# Patient Record
Sex: FEMALE | Race: WHITE | Employment: FULL TIME | URBAN - METROPOLITAN AREA
[De-identification: names, ages, dates, MRNs, and addresses within clinical notes are randomized per-mention and may not be internally consistent; named-entity substitution may affect disease eponyms.]

---

## 2017-05-17 DIAGNOSIS — R60.0 PEDAL EDEMA: ICD-10-CM

## 2017-05-17 RX ORDER — FUROSEMIDE 40 MG/1
TABLET ORAL
Qty: 90 TAB | Refills: 3 | Status: SHIPPED | OUTPATIENT
Start: 2017-05-17 | End: 2018-06-18 | Stop reason: SDUPTHER

## 2017-12-12 ENCOUNTER — OFFICE VISIT (OUTPATIENT)
Dept: FAMILY MEDICINE CLINIC | Facility: CLINIC | Age: 52
End: 2017-12-12

## 2017-12-12 VITALS
SYSTOLIC BLOOD PRESSURE: 142 MMHG | BODY MASS INDEX: 37.39 KG/M2 | RESPIRATION RATE: 16 BRPM | DIASTOLIC BLOOD PRESSURE: 77 MMHG | WEIGHT: 219 LBS | HEIGHT: 64 IN | TEMPERATURE: 97.3 F | OXYGEN SATURATION: 97 %

## 2017-12-12 DIAGNOSIS — E66.09 CLASS 2 OBESITY DUE TO EXCESS CALORIES WITHOUT SERIOUS COMORBIDITY WITH BODY MASS INDEX (BMI) OF 37.0 TO 37.9 IN ADULT: ICD-10-CM

## 2017-12-12 DIAGNOSIS — Z72.0 TOBACCO ABUSE: ICD-10-CM

## 2017-12-12 DIAGNOSIS — K21.9 GASTROESOPHAGEAL REFLUX DISEASE WITHOUT ESOPHAGITIS: Primary | ICD-10-CM

## 2017-12-12 DIAGNOSIS — I87.2 VENOUS INSUFFICIENCY OF BOTH LOWER EXTREMITIES: ICD-10-CM

## 2017-12-12 PROBLEM — E66.01 OBESITY, MORBID (HCC): Status: ACTIVE | Noted: 2017-12-12

## 2017-12-12 RX ORDER — BUPROPION HYDROCHLORIDE 150 MG/1
150 TABLET, EXTENDED RELEASE ORAL 2 TIMES DAILY
Qty: 60 TAB | Refills: 5 | Status: SHIPPED | OUTPATIENT
Start: 2017-12-12 | End: 2018-06-19 | Stop reason: ALTCHOICE

## 2017-12-12 RX ORDER — NAPROXEN SODIUM 220 MG
220 TABLET ORAL AS NEEDED
COMMUNITY

## 2017-12-12 RX ORDER — PHENOL/SODIUM PHENOLATE
20 AEROSOL, SPRAY (ML) MUCOUS MEMBRANE DAILY
Qty: 90 TAB | Refills: 3 | Status: SHIPPED | OUTPATIENT
Start: 2017-12-12 | End: 2019-01-06 | Stop reason: SDUPTHER

## 2017-12-12 NOTE — PATIENT INSTRUCTIONS
Body Mass Index: Care Instructions  Your Care Instructions    Body mass index (BMI) can help you see if your weight is raising your risk for health problems. It uses a formula to compare how much you weigh with how tall you are. · A BMI lower than 18.5 is considered underweight. · A BMI between 18.5 and 24.9 is considered healthy. · A BMI between 25 and 29.9 is considered overweight. A BMI of 30 or higher is considered obese. If your BMI is in the normal range, it means that you have a lower risk for weight-related health problems. If your BMI is in the overweight or obese range, you may be at increased risk for weight-related health problems, such as high blood pressure, heart disease, stroke, arthritis or joint pain, and diabetes. If your BMI is in the underweight range, you may be at increased risk for health problems such as fatigue, lower protection (immunity) against illness, muscle loss, bone loss, hair loss, and hormone problems. BMI is just one measure of your risk for weight-related health problems. You may be at higher risk for health problems if you are not active, you eat an unhealthy diet, or you drink too much alcohol or use tobacco products. Follow-up care is a key part of your treatment and safety. Be sure to make and go to all appointments, and call your doctor if you are having problems. It's also a good idea to know your test results and keep a list of the medicines you take. How can you care for yourself at home? · Practice healthy eating habits. This includes eating plenty of fruits, vegetables, whole grains, lean protein, and low-fat dairy. · If your doctor recommends it, get more exercise. Walking is a good choice. Bit by bit, increase the amount you walk every day. Try for at least 30 minutes on most days of the week. · Do not smoke. Smoking can increase your risk for health problems. If you need help quitting, talk to your doctor about stop-smoking programs and medicines. These can increase your chances of quitting for good. · Limit alcohol to 2 drinks a day for men and 1 drink a day for women. Too much alcohol can cause health problems. If you have a BMI higher than 25  · Your doctor may do other tests to check your risk for weight-related health problems. This may include measuring the distance around your waist. A waist measurement of more than 40 inches in men or 35 inches in women can increase the risk of weight-related health problems. · Talk with your doctor about steps you can take to stay healthy or improve your health. You may need to make lifestyle changes to lose weight and stay healthy, such as changing your diet and getting regular exercise. If you have a BMI lower than 18.5  · Your doctor may do other tests to check your risk for health problems. · Talk with your doctor about steps you can take to stay healthy or improve your health. You may need to make lifestyle changes to gain or maintain weight and stay healthy, such as getting more healthy foods in your diet and doing exercises to build muscle. Where can you learn more? Go to http://jamie-boaz.info/. Enter S176 in the search box to learn more about \"Body Mass Index: Care Instructions. \"  Current as of: October 13, 2016  Content Version: 11.4  © 2421-6367 Healthwise, Incorporated. Care instructions adapted under license by [employer] (which disclaims liability or warranty for this information). If you have questions about a medical condition or this instruction, always ask your healthcare professional. [address] any warranty or liability for your use of this information.

## 2017-12-12 NOTE — PROGRESS NOTES
HISTORY OF PRESENT ILLNESS  Beatrice Conteh is a 50 y.o. female. HPI Comments: Seen in follow-up for GERD, pedal edema. She is still smoking 1 ppd - wonders about medication for cessation. No problems with current medications, which are working well for her. She didn't follow-up with Vascular after her last visit. Past Medical History:   Diagnosis Date    GERD (gastroesophageal reflux disease)     Musculoskeletal disorder        Past Surgical History:   Procedure Laterality Date    HX CERVICAL FUSION      plated for fracture    HX KNEE ARTHROSCOPY Right medial meniscus tear    HX ORTHOPAEDIC Right ACL resection       History   Smoking Status    Current Every Day Smoker   Smokeless Tobacco    Never Used     Current Outpatient Prescriptions   Medication Sig    naproxen sodium (ALEVE) 220 mg tablet Take 220 mg by mouth as needed.  furosemide (LASIX) 40 mg tablet TAKE 1 TABLET BY MOUTH DAILY FOR EDEMA    Omeprazole delayed release (PRILOSEC D/R) 20 mg tablet Take 1 Tab by mouth daily. Indications: GASTROESOPHAGEAL REFLUX    MV-Min-Folic Acid-Lutein (CENTRUM SILVER) 500-250 mcg chew Take  by mouth. No current facility-administered medications for this visit. Review of Systems   Constitutional: Negative for chills and fever. Respiratory: Negative for shortness of breath. Cardiovascular: Positive for leg swelling. Negative for chest pain and palpitations. Gastrointestinal: Negative for nausea and vomiting. Neurological: Negative for tingling, focal weakness and headaches. Psychiatric/Behavioral: The patient does not have insomnia. Visit Vitals    /77    Temp 97.3 °F (36.3 °C)    Resp 16    Ht 5' 4\" (1.626 m)    Wt 219 lb (99.3 kg)    SpO2 97%    BMI 37.59 kg/m2       Physical Exam   Constitutional: She is oriented to person, place, and time. She appears well-developed and well-nourished. No distress. Neck: Neck supple. No thyromegaly present.    Carotid bruit absent   Cardiovascular: Normal rate, regular rhythm and intact distal pulses. Exam reveals no gallop and no friction rub. No murmur heard. Pulmonary/Chest: Effort normal and breath sounds normal. No respiratory distress. Musculoskeletal: She exhibits edema. Lymphadenopathy:     She has no cervical adenopathy. Neurological: She is alert and oriented to person, place, and time. Skin: Skin is warm and dry. Psychiatric: She has a normal mood and affect. Her behavior is normal. Judgment and thought content normal.   Nursing note and vitals reviewed. ASSESSMENT and PLAN     ICD-10-CM ICD-9-CM    1. Gastroesophageal reflux disease without esophagitis K21.9 530.81 Omeprazole delayed release (PRILOSEC D/R) 20 mg tablet   2. Venous insufficiency of both lower extremities I87.2 459.81 REFERRAL TO VASCULAR CENTER   3. Class 2 obesity due to excess calories without serious comorbidity with body mass index (BMI) of 37.0 to 37.9 in adult E66.09 278.00     Z68.37 V85.37    4. Tobacco abuse Z72.0 305.1 buPROPion SR (WELLBUTRIN SR) 150 mg SR tablet     Follow-up Disposition:  Return in about 6 months (around 6/12/2018). the following changes in treatment are made: Wellbutrin for smoking cessation. Use of nicotine patches reviewed. Refills as noted. Referred back to Vascular Surgery. reviewed diet, exercise and weight control  very strongly urged to quit smoking to reduce cardiovascular risk  reviewed medications and side effects in detail    Discussed the patient's BMI with her. The BMI follow up plan is as follows:     dietary management education, guidance, and counseling  encourage exercise  monitor weight  prescribed dietary intake    An After Visit Summary was printed and given to the patient. Plan of care reviewed - patient verbalize(s) understanding and agreement.

## 2017-12-12 NOTE — MR AVS SNAPSHOT
Visit Information Date & Time Provider Department Dept. Phone Encounter #  
 12/12/2017  2:15 PM Cordell Castelan MD [facility] [phone] Follow-up Instructions Return in about 6 months (around 6/12/2018). Upcoming Health Maintenance Date Due Pneumococcal 19-64 Medium Risk (1 of 1 - PPSV23) 4/17/1986 FOBT Q 1 YEAR AGE 50-75 4/17/2017 PAP AKA CERVICAL CYTOLOGY 10/20/2018 DTaP/Tdap/Td series (2 - Td) 3/24/2025 Allergies as of 12/12/2017  Review Complete On: 12/12/2017 By: Cordell Castelan MD  
 No Known Allergies Current Immunizations  Never Reviewed Name Date Tdap 3/24/2015  3:27 PM  
  
 Not reviewed this visit You Were Diagnosed With   
  
 Codes Comments Gastroesophageal reflux disease without esophagitis    -  Primary ICD-10-CM: K21.9 ICD-9-CM: 530.81 Venous insufficiency of both lower extremities     ICD-10-CM: I87.2 ICD-9-CM: 459.81 Class 2 obesity due to excess calories without serious comorbidity with body mass index (BMI) of 37.0 to 37.9 in adult     ICD-10-CM: E66.09, S69.02 ICD-9-CM: 278.00, V85.37 Tobacco abuse     ICD-10-CM: Z72.0 ICD-9-CM: 305.1 GERD without esophagitis     ICD-10-CM: K21.9 ICD-9-CM: 530.81 Vitals BP Temp Resp Height(growth percentile) Weight(growth percentile) SpO2  
 142/77 97.3 °F (36.3 °C) 16 5' 4\" (1.626 m) 219 lb (99.3 kg) 97% BMI OB Status Smoking Status 37.59 kg/m2 Premenopausal Current Every Day Smoker Vitals History BMI and BSA Data Body Mass Index Body Surface Area  
 37.59 kg/m 2 2.12 m 2 Preferred Pharmacy Pharmacy Name Phone [address], [address] [place] [address] [phone] Your Updated Medication List  
  
   
This list is accurate as of: 12/12/17  3:44 PM.  Always use your most recent med list.  
  
  
  
  
 ALEVE 220 mg tablet Generic drug:  naproxen sodium Take 220 mg by mouth as needed. buPROPion  mg SR tablet Commonly known as:  Ferdinand Ponds Take 1 Tab by mouth two (2) times a day. Indications: Smoking Cessation CENTRUM SILVER 500-250 mcg Chew Generic drug:  MV-Min-Folic Acid-Lutein Take  by mouth. furosemide 40 mg tablet Commonly known as:  LASIX TAKE 1 TABLET BY MOUTH DAILY FOR EDEMA Omeprazole delayed release 20 mg tablet Commonly known as:  PRILOSEC D/R Take 1 Tab by mouth daily. Indications: gastroesophageal reflux disease Prescriptions Sent to Pharmacy Refills Omeprazole delayed release (PRILOSEC D/R) 20 mg tablet 3 Sig: Take 1 Tab by mouth daily. Indications: gastroesophageal reflux disease Class: Normal  
 Pharmacy: [address] Ph #: [phone] Route: Oral  
 buPROPion SR (WELLBUTRIN SR) 150 mg SR tablet 5 Sig: Take 1 Tab by mouth two (2) times a day. Indications: Smoking Cessation Class: Normal  
 Pharmacy: [address] Ph #: [phone] Route: Oral  
  
We Performed the Following REFERRAL TO VASCULAR CENTER [[id number] Custom] Comments: F/U venous insufficiency (2016) Follow-up Instructions Return in about 6 months (around 6/12/2018). Referral Information Referral ID Referred By Referred To  
  
 [id number] Jean Pierre Conway MD   
   [address], [address] Phone: [phone] Fax: [phone] Visits Status Start Date End Date 1 New Request 12/12/17 12/12/18 If your referral has a status of pending review or denied, additional information will be sent to support the outcome of this decision. Patient Instructions Body Mass Index: Care Instructions Your Care Instructions Body mass index (BMI) can help you see if your weight is raising your risk for health problems. It uses a formula to compare how much you weigh with how tall you are. · A BMI lower than 18.5 is considered underweight. · A BMI between 18.5 and 24.9 is considered healthy. · A BMI between 25 and 29.9 is considered overweight. A BMI of 30 or higher is considered obese. If your BMI is in the normal range, it means that you have a lower risk for weight-related health problems. If your BMI is in the overweight or obese range, you may be at increased risk for weight-related health problems, such as high blood pressure, heart disease, stroke, arthritis or joint pain, and diabetes. If your BMI is in the underweight range, you may be at increased risk for health problems such as fatigue, lower protection (immunity) against illness, muscle loss, bone loss, hair loss, and hormone problems. BMI is just one measure of your risk for weight-related health problems. You may be at higher risk for health problems if you are not active, you eat an unhealthy diet, or you drink too much alcohol or use tobacco products. Follow-up care is a key part of your treatment and safety. Be sure to make and go to all appointments, and call your doctor if you are having problems. It's also a good idea to know your test results and keep a list of the medicines you take. How can you care for yourself at home? · Practice healthy eating habits. This includes eating plenty of fruits, vegetables, whole grains, lean protein, and low-fat dairy. · If your doctor recommends it, get more exercise. Walking is a good choice. Bit by bit, increase the amount you walk every day. Try for at least 30 minutes on most days of the week. · Do not smoke. Smoking can increase your risk for health problems. If you need help quitting, talk to your doctor about stop-smoking programs and medicines. These can increase your chances of quitting for good. · Limit alcohol to 2 drinks a day for men and 1 drink a day for women. Too much alcohol can cause health problems. If you have a BMI higher than 25 · Your doctor may do other tests to check your risk for weight-related health problems. This may include measuring the distance around your waist. A waist measurement of more than 40 inches in men or 35 inches in women can increase the risk of weight-related health problems. · Talk with your doctor about steps you can take to stay healthy or improve your health. You may need to make lifestyle changes to lose weight and stay healthy, such as changing your diet and getting regular exercise. If you have a BMI lower than 18.5 · Your doctor may do other tests to check your risk for health problems. · Talk with your doctor about steps you can take to stay healthy or improve your health. You may need to make lifestyle changes to gain or maintain weight and stay healthy, such as getting more healthy foods in your diet and doing exercises to build muscle. Where can you learn more? Go to http://jamie-boaz.info/. Enter S176 in the search box to learn more about \"Body Mass Index: Care Instructions. \" Current as of: October 13, 2016 Content Version: 11.4 © 1469-9635 [employer]. Care instructions adapted under license by [email or web address] (which disclaims liability or warranty for this information). If you have questions about a medical condition or this instruction, always ask your healthcare professional. [address] any warranty or liability for your use of this information. Introducing [facility]! Dear Kathrine Wood: 
Thank you for requesting a [employer] account. Our records indicate that you have previously registered for a [employer] account but its currently inactive. Please call our [employer] support line at [phone]. Additional Information If you have questions, please visit the Frequently Asked Questions section of the [employer] website at [email or web address] Remember, [employer] is NOT to be used for urgent needs. For medical emergencies, dial 911. Now available from your iPhone and Android! Please provide this summary of care documentation to your next provider. Your primary care clinician is listed as Maryjo Schirmer. If you have any questions after today's visit, please call [phone].

## 2018-01-10 ENCOUNTER — OFFICE VISIT (OUTPATIENT)
Dept: VASCULAR SURGERY | Age: 53
End: 2018-01-10

## 2018-01-10 VITALS
HEIGHT: 64 IN | HEART RATE: 80 BPM | DIASTOLIC BLOOD PRESSURE: 78 MMHG | SYSTOLIC BLOOD PRESSURE: 140 MMHG | WEIGHT: 219 LBS | BODY MASS INDEX: 37.39 KG/M2

## 2018-01-10 DIAGNOSIS — I87.2 VENOUS INSUFFICIENCY OF BOTH LOWER EXTREMITIES: Primary | ICD-10-CM

## 2018-01-10 DIAGNOSIS — R60.0 BILATERAL LEG EDEMA: ICD-10-CM

## 2018-01-10 NOTE — MR AVS SNAPSHOT
Visit Information Date & Time Provider Department Dept. Phone Encounter #  
 1/10/2018  2:15 PM Edna Ellis, [address] [facility] [phone] [id number] Your Appointments 6/12/2018  2:30 PM  
ROUTINE CARE with Vanesa Mendes MD  
[employer] ([facility]) Appt Note: 6  mos follow up [address] [place] [id number]  
[phone]  
  
   
 [address] Upcoming Health Maintenance Date Due Pneumococcal 19-64 Medium Risk (1 of 1 - PPSV23) 4/17/1986 FOBT Q 1 YEAR AGE 50-75 4/17/2017 BREAST CANCER SCRN MAMMOGRAM 5/1/2017 PAP AKA CERVICAL CYTOLOGY 10/20/2018 DTaP/Tdap/Td series (2 - Td) 3/24/2025 Allergies as of 1/10/2018  Review Complete On: 1/10/2018 By: Evie Miller LPN No Known Allergies Current Immunizations  Never Reviewed Name Date Tdap 3/24/2015  3:27 PM  
  
 Not reviewed this visit You Were Diagnosed With   
  
 Codes Comments Venous insufficiency of both lower extremities    -  Primary ICD-10-CM: I87.2 ICD-9-CM: 459.81 Bilateral leg edema     ICD-10-CM: R60.0 ICD-9-CM: 779. 3 Vitals BP Pulse Height(growth percentile) Weight(growth percentile) BMI OB Status 140/78 (BP 1 Location: Left arm, BP Patient Position: Sitting) 80 5' 4\" (1.626 m) 219 lb (99.3 kg) 37.59 kg/m2 Premenopausal  
 Smoking Status Current Every Day Smoker Vitals History BMI and BSA Data Body Mass Index Body Surface Area  
 37.59 kg/m 2 2.12 m 2 Preferred Pharmacy Pharmacy Name Phone [address], [address] [place] [address] [phone] Your Updated Medication List  
  
   
This list is accurate as of: 1/10/18  3:37 PM.  Always use your most recent med list.  
  
  
  
  
 ALEVE 220 mg tablet Generic drug:  naproxen sodium Take 220 mg by mouth as needed. buPROPion  mg SR tablet Commonly known as:  Michael Hilts Take 1 Tab by mouth two (2) times a day. Indications: Smoking Cessation CENTRUM SILVER 500-250 mcg Chew Generic drug:  MV-Min-Folic Acid-Lutein Take  by mouth. furosemide 40 mg tablet Commonly known as:  LASIX TAKE 1 TABLET BY MOUTH DAILY FOR EDEMA Omeprazole delayed release 20 mg tablet Commonly known as:  PRILOSEC D/R Take 1 Tab by mouth daily. Indications: gastroesophageal reflux disease To-Do List   
 01/12/2018 Imaging:  DUPLEX LOWER EXT VENOUS BILAT AMB Introducing [facility]! Carissa Form introduces [employer] patient portal. Now you can access parts of your medical record, email your doctor's office, and request medication refills online. 1. In your internet browser, go to [email or web address] 2. Click on the First Time User? Click Here link in the Sign In box. You will see the New Member Sign Up page. 3. Enter your [employer] Access Code exactly as it appears below. You will not need to use this code after youve completed the sign-up process. If you do not sign up before the expiration date, you must request a new code. · [employer] Access Code: [email or web address] Expires: 4/10/2018  2:05 PM 
 
4. Enter the last four digits of your Social Security Number (xxxx) and Date of Birth (mm/dd/yyyy) as indicated and click Submit. You will be taken to the next sign-up page. 5. Create a [employer] Softwaret ID. This will be your [employer] login ID and cannot be changed, so think of one that is secure and easy to remember. 6. Create a [employer] password. You can change your password at any time. 7. Enter your Password Reset Question and Answer. This can be used at a later time if you forget your password. 8. Enter your e-mail address. You will receive e-mail notification when new information is available in [address] 9. Click Sign Up. You can now view and download portions of your medical record. 10. Click the Download Summary menu link to download a portable copy of your medical information. If you have questions, please visit the Frequently Asked Questions section of the [employer] website. Remember, [employer] is NOT to be used for urgent needs. For medical emergencies, dial 911. Now available from your iPhone and Android! Please provide this summary of care documentation to your next provider. Your primary care clinician is listed as Erick Yao. If you have any questions after today's visit, please call [phone].

## 2018-01-11 NOTE — PROGRESS NOTES
Roland Turner    Chief Complaint   Patient presents with    Varicose Veins       History and Physical    It has been 2 years since Ms Kay Giang was in for evaluation of lower extremity edema. She had ultrasound study but then no follow up. She confesses as well she had gone through a job change and had a very busy schedule  She is back now because her leg swelling has continued to bother her  As previously noted, it is generally worse on the left. It is below the knees and into the feet  As per previous discussions, she has made efforts with using compression stockings, leg elevation, and exercise. 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 so does have little time to elevate but tries when she can  The amount of swelling that had progressed is now contributing to heaviness, leg fatigue, and sometimes pain, as well as early skin color changes     Her previous study was negative for DVT but there was some venous reflux noted       Past Medical History:   Diagnosis Date    GERD (gastroesophageal reflux disease)     Musculoskeletal disorder      Patient Active Problem List   Diagnosis Code    GERD (gastroesophageal reflux disease) K21.9    Venous insufficiency of both lower extremities I87.2    Class 2 obesity due to excess calories without serious comorbidity with body mass index (BMI) of 37.0 to 37.9 in adult E66.09, Z68.37    Tobacco abuse Z72.0     Past Surgical History:   Procedure Laterality Date    HX CERVICAL FUSION      plated for fracture    HX KNEE ARTHROSCOPY Right medial meniscus tear    HX ORTHOPAEDIC Right ACL resection     Current Outpatient Prescriptions   Medication Sig Dispense Refill    naproxen sodium (ALEVE) 220 mg tablet Take 220 mg by mouth as needed.  Omeprazole delayed release (PRILOSEC D/R) 20 mg tablet Take 1 Tab by mouth daily.  Indications: gastroesophageal reflux disease 90 Tab 3    buPROPion SR (WELLBUTRIN SR) 150 mg SR tablet Take 1 Tab by mouth two (2) times a day. Indications: Smoking Cessation 60 Tab 5    furosemide (LASIX) 40 mg tablet TAKE 1 TABLET BY MOUTH DAILY FOR EDEMA 90 Tab 3    MV-Min-Folic Acid-Lutein (CENTRUM SILVER) 500-250 mcg chew Take  by mouth. No Known Allergies    Review of Systems    Review of Systems - History obtained from chart review and the patient  General ROS: negative  Psychological ROS: negative  Ophthalmic ROS: negative  Respiratory ROS: negative  Cardiovascular ROS: negative  Gastrointestinal ROS: negative  Musculoskeletal ROS: negative  Neurological ROS: negative  Dermatological ROS: no history of ulcers or celluliltis  Vascular ROS: leg edema and varicose veins    Physical   Visit Vitals    /78 (BP 1 Location: Left arm, BP Patient Position: Sitting)    Pulse 80    Ht 5' 4\" (1.626 m)    Wt 219 lb (99.3 kg)    BMI 37.59 kg/m2     General:  Alert, cooperative, no distress. Head:  Normocephalic, without obvious abnormality, atraumatic. Eyes:     Conjunctivae/corneas clear. Pupils equal, round, reactive to light. Extraocular movements intact. Extremities: Bilateral lower extremity edema, worse on the left, about 3+ edema  Scattered small varicosites/venules/spider veins scattered in the legs.  No phlebitis     Pulses: Difficult to palpate but no signs of arterial insufficiency    Skin: Early hyperpigmentation changes but no ulcers         Impression/Plan:     ICD-10-CM ICD-9-CM    1. Venous insufficiency of both lower extremities I87.2 459.81 DUPLEX LOWER EXT VENOUS BILAT AMB   2. Bilateral leg edema R60.0 782.3 DUPLEX LOWER EXT VENOUS BILAT AMB     Orders Placed This Encounter    DUPLEX LOWER EXT VENOUS BILAT AMB (Reflux)     I again discussed venous disease and provided updated literature for venous reflux and treatments  Continue with current efforts with compression and elevation  With notable reflux seen previously, and since study now 3years old and with worsening symptoms, would go ahead and repeat study  I will then call results and further recommendations for treatment options      JERICHO Machuca    Portions of this note have been entered using voice recognition software.

## 2018-01-17 ENCOUNTER — OFFICE VISIT (OUTPATIENT)
Dept: VASCULAR SURGERY | Age: 53
End: 2018-01-17

## 2018-01-17 DIAGNOSIS — R60.0 BILATERAL LEG EDEMA: ICD-10-CM

## 2018-01-17 DIAGNOSIS — I87.2 VENOUS INSUFFICIENCY OF BOTH LOWER EXTREMITIES: ICD-10-CM

## 2018-01-17 NOTE — PROCEDURES
[facility]   *** FINAL REPORT ***    Name: Mirna Pereira  MRN: [id number]       Outpatient  : 1965  HIS Order #: [id number]  [address] [facility] Visit #: [id number]  Date: 2018    TYPE OF TEST: Peripheral Venous Testing    REASON FOR TEST  Edema    Right Leg:-  Deep venous thrombosis:           No  Superficial venous thrombosis:    No  Deep venous insufficiency:        No  Superficial venous insufficiency: No    Left Leg:-  Deep venous thrombosis:           No  Superficial venous thrombosis:    No  Deep venous insufficiency:        No  Superficial venous insufficiency: Yes    Abnormal Valve Closure Times (seconds):    Left SSV:             1.1    Vein Mapping:    Diam.   Depth  (mm)    Right Great Saphenous Vein:    High Thigh:    Mid Thigh:    Low Thigh:    Knee:    High Calf:    Low Calf: Ankle:    Right Small Saphenous Vein:    SPJ:    Mid Calf: Ankle:    Giacomini:  Accessory saph.:  Chapin :  Abrams :    Left Great Saphenous Vein:    High Thigh:    Mid Thigh:    Low Thigh:    Knee:    High Calf:    Low Calf: Ankle:    Left Small Saphenous Vein:    SPJ:             2.8    Mid Calf:        1.9    Ankle:           1.3    Giacomini:  Accessory saph.:  Chapin :  Abrams :      INTERPRETATION/FINDINGS  Duplex images were obtained using 2-D gray scale, color flow and  spectral doppler analysis. The reflux exam was performed in the reverse   trendelenburg position. 1. No evidence of deep vein thrombosis or deep venous reflux in the  common femoral, proximal deep femoral, femoral, popliteal, posterior  tibial and peroneal veins bilaterally. 2. Left small saphenous vein reflux in the mid segment. The vein  extends up the posterior thigh. 3. No evidence of right small saphenous vein reflux from the  sapheno-popliteal junction to distal calf. 4. No evidence of great saphenous vein reflux bilaterally from the  sapheno-femoral junction to below the knee. 5.  Interstitial edema noted in both calves. 6. Triphasic posterior tibial artery flow bilaterally. ADDITIONAL COMMENTS    I have personally reviewed the data relevant to the interpretation of  this  study. TECHNOLOGIST: Gailen Blossom Darylene Bender, RVT, [facility]  Signed: 01/17/2018 02:03 PM    PHYSICIAN: Chantal Morejon D.O.   Signed: 01/19/2018 01:15 PM

## 2018-01-22 ENCOUNTER — TELEPHONE (OUTPATIENT)
Dept: VASCULAR SURGERY | Age: 53
End: 2018-01-22

## 2018-01-23 ENCOUNTER — TELEPHONE (OUTPATIENT)
Dept: VASCULAR SURGERY | Age: 53
End: 2018-01-23

## 2018-01-23 NOTE — TELEPHONE ENCOUNTER
Did finally speak with patient about results  No significant reflux   Could try lymphedema therapy or pump  But she confesses since she was here she did decrease her salt intake and has been faithfully wearing the compression stockings and notices good improvement in symptoms  She will stick with this for now and call back if any changes

## 2018-06-19 ENCOUNTER — OFFICE VISIT (OUTPATIENT)
Dept: FAMILY MEDICINE CLINIC | Facility: CLINIC | Age: 53
End: 2018-06-19

## 2018-06-19 VITALS
TEMPERATURE: 98 F | HEIGHT: 64 IN | RESPIRATION RATE: 18 BRPM | OXYGEN SATURATION: 96 % | HEART RATE: 73 BPM | DIASTOLIC BLOOD PRESSURE: 94 MMHG | WEIGHT: 223 LBS | SYSTOLIC BLOOD PRESSURE: 147 MMHG | BODY MASS INDEX: 38.07 KG/M2

## 2018-06-19 DIAGNOSIS — H10.13 ALLERGIC CONJUNCTIVITIS OF BOTH EYES: ICD-10-CM

## 2018-06-19 DIAGNOSIS — K21.9 GASTROESOPHAGEAL REFLUX DISEASE WITHOUT ESOPHAGITIS: ICD-10-CM

## 2018-06-19 DIAGNOSIS — E66.09 CLASS 2 OBESITY DUE TO EXCESS CALORIES WITHOUT SERIOUS COMORBIDITY WITH BODY MASS INDEX (BMI) OF 37.0 TO 37.9 IN ADULT: ICD-10-CM

## 2018-06-19 DIAGNOSIS — I10 ESSENTIAL HYPERTENSION: Primary | ICD-10-CM

## 2018-06-19 DIAGNOSIS — I89.0 LYMPHEDEMA OF BOTH LOWER EXTREMITIES: ICD-10-CM

## 2018-06-19 PROBLEM — Z72.0 TOBACCO ABUSE: Status: RESOLVED | Noted: 2017-12-12 | Resolved: 2018-06-19

## 2018-06-19 NOTE — PROGRESS NOTES
Chief Complaint   Patient presents with    Obesity       Pt preferred language for health care discussion is english. Is someone accompanying this pt? no    Is the patient using any DME equipment during OV? no    Depression Screening:  PHQ over the last two weeks 6/19/2018   Little interest or pleasure in doing things Not at all   Feeling down, depressed or hopeless Not at all   Total Score PHQ 2 0       Learning Assessment:  Learning Assessment 6/19/2018   PRIMARY LEARNER Patient   CO-LEARNER CAREGIVER -   PRIMARY LANGUAGE ENGLISH   LEARNER PREFERENCE PRIMARY DEMONSTRATION     READING   ANSWERED BY patient   RELATIONSHIP SELF       Health Maintenance reviewed and discussed per provider. Yes    Pt is due for   Health Maintenance Due   Topic Date Due    Pneumococcal 19-64 Medium Risk (1 of 1 - PPSV23) 04/17/1986    FOBT Q 1 YEAR AGE 50-75  04/17/2017    BREAST CANCER SCRN MAMMOGRAM  05/01/2017   . Please order/place referral if appropriate. Coordination of Care:  1. Have you been to the ER, urgent care clinic since your last visit? Hospitalized since your last visit? no    2. Have you seen or consulted any other health care providers outside of the [address] [place] [address] since your last visit? Include any pap smears or colon screening.  no

## 2018-06-19 NOTE — MR AVS SNAPSHOT
Jeanette Dwaine 
 
 
 [address] [place] [address] 
[phone] Patient: Raven Gurrola MRN: [id number] :1965 Visit Information Date & Time Provider Department Dept. Phone Encounter #  
 2018  2:15 PM Erin Lopez MD [facility] [phone] [id number] Follow-up Instructions Return in about 3 months (around 2018). Upcoming Health Maintenance Date Due Pneumococcal 19-64 Medium Risk (1 of 1 - PPSV23) 1986 FOBT Q 1 YEAR AGE 50-75 2017 BREAST CANCER SCRN MAMMOGRAM 2017 Influenza Age 5 to Adult 2018 PAP AKA CERVICAL CYTOLOGY 10/20/2018 DTaP/Tdap/Td series (2 - Td) 3/24/2025 Allergies as of 2018  Review Complete On: 2018 By: Erin Lopez MD  
 No Known Allergies Current Immunizations  Never Reviewed Name Date Tdap 3/24/2015  3:27 PM  
  
 Not reviewed this visit You Were Diagnosed With   
  
 Codes Comments Essential hypertension    -  Primary ICD-10-CM: I10 
ICD-9-CM: 401.9 Gastroesophageal reflux disease without esophagitis     ICD-10-CM: K21.9 ICD-9-CM: 530.81 Class 2 obesity due to excess calories without serious comorbidity with body mass index (BMI) of 37.0 to 37.9 in adult     ICD-10-CM: E66.09, K50.31 ICD-9-CM: 278.00, V85.37 Lymphedema of both lower extremities     ICD-10-CM: I89.0 ICD-9-CM: 770.1 Allergic conjunctivitis of both eyes     ICD-10-CM: H10.13 ICD-9-CM: 372.14 Vitals BP Pulse Temp Resp Height(growth percentile) Weight(growth percentile) (!) 147/94 (BP 1 Location: Right arm, BP Patient Position: Sitting) 73 98 °F (36.7 °C) (Oral) 18 5' 4\" (1.626 m) 223 lb (101.2 kg) SpO2 BMI OB Status Smoking Status 96% 38.28 kg/m2 Premenopausal Former Smoker BMI and BSA Data Body Mass Index Body Surface Area  
 38.28 kg/m 2 2.14 m 2 Preferred Pharmacy Pharmacy Name Phone [address], [address] [place] [address] [phone] Your Updated Medication List  
  
   
This list is accurate as of 6/19/18  2:59 PM.  Always use your most recent med list.  
  
  
  
  
 ALEVE 220 mg tablet Generic drug:  naproxen sodium Take 220 mg by mouth as needed. CENTRUM SILVER 500-250 mcg Chew Generic drug:  MV-Min-Folic Acid-Lutein Take  by mouth. furosemide 40 mg tablet Commonly known as:  LASIX TAKE 1 TABLET BY MOUTH ONCE DAILY FOR EDEMA Omeprazole delayed release 20 mg tablet Commonly known as:  PRILOSEC D/R Take 1 Tab by mouth daily. Indications: gastroesophageal reflux disease Follow-up Instructions Return in about 3 months (around 9/19/2018). Patient Instructions DASH Diet: Care Instructions Your Care Instructions The DASH diet is an eating plan that can help lower your blood pressure. DASH stands for Dietary Approaches to Stop Hypertension. Hypertension is high blood pressure. The DASH diet focuses on eating foods that are high in calcium, potassium, and magnesium. These nutrients can lower blood pressure. The foods that are highest in these nutrients are fruits, vegetables, low-fat dairy products, nuts, seeds, and legumes. But taking calcium, potassium, and magnesium supplements instead of eating foods that are high in those nutrients does not have the same effect. The DASH diet also includes whole grains, fish, and poultry. The DASH diet is one of several lifestyle changes your doctor may recommend to lower your high blood pressure. Your doctor may also want you to decrease the amount of sodium in your diet. Lowering sodium while following the DASH diet can lower blood pressure even further than just the DASH diet alone. Follow-up care is a key part of your treatment and safety.  Be sure to make and go to all appointments, and call your doctor if you are having problems. It's also a good idea to know your test results and keep a list of the medicines you take. How can you care for yourself at home? Following the DASH diet · Eat 4 to 5 servings of fruit each day. A serving is 1 medium-sized piece of fruit, ½ cup chopped or canned fruit, 1/4 cup dried fruit, or 4 ounces (½ cup) of fruit juice. Choose fruit more often than fruit juice. · Eat 4 to 5 servings of vegetables each day. A serving is 1 cup of lettuce or raw leafy vegetables, ½ cup of chopped or cooked vegetables, or 4 ounces (½ cup) of vegetable juice. Choose vegetables more often than vegetable juice. · Get 2 to 3 servings of low-fat and fat-free dairy each day. A serving is 8 ounces of milk, 1 cup of yogurt, or 1 ½ ounces of cheese. · Eat 6 to 8 servings of grains each day. A serving is 1 slice of bread, 1 ounce of dry cereal, or ½ cup of cooked rice, pasta, or cooked cereal. Try to choose whole-grain products as much as possible. · Limit lean meat, poultry, and fish to 2 servings each day. A serving is 3 ounces, about the size of a deck of cards. · Eat 4 to 5 servings of nuts, seeds, and legumes (cooked dried beans, lentils, and split peas) each week. A serving is 1/3 cup of nuts, 2 tablespoons of seeds, or ½ cup of cooked beans or peas. · Limit fats and oils to 2 to 3 servings each day. A serving is 1 teaspoon of vegetable oil or 2 tablespoons of salad dressing. · Limit sweets and added sugars to 5 servings or less a week. A serving is 1 tablespoon jelly or jam, ½ cup sorbet, or 1 cup of lemonade. · Eat less than 2,300 milligrams (mg) of sodium a day. If you limit your sodium to 1,500 mg a day, you can lower your blood pressure even more. Tips for success · Start small. Do not try to make dramatic changes to your diet all at once. You might feel that you are missing out on your favorite foods and then be more likely to not follow the plan.  Make small changes, and stick with them. Once those changes become habit, add a few more changes. · Try some of the following: ¨ Make it a goal to eat a fruit or vegetable at every meal and at snacks. This will make it easy to get the recommended amount of fruits and vegetables each day. ¨ Try yogurt topped with fruit and nuts for a snack or healthy dessert. ¨ Add lettuce, tomato, cucumber, and onion to sandwiches. ¨ Combine a ready-made pizza crust with low-fat mozzarella cheese and lots of vegetable toppings. Try using tomatoes, squash, spinach, broccoli, carrots, cauliflower, and onions. ¨ Have a variety of cut-up vegetables with a low-fat dip as an appetizer instead of chips and dip. ¨ Sprinkle sunflower seeds or chopped almonds over salads. Or try adding chopped walnuts or almonds to cooked vegetables. ¨ Try some vegetarian meals using beans and peas. Add garbanzo or kidney beans to salads. Make burritos and tacos with mashed casey beans or black beans. Where can you learn more? Go to http://jamieLaunchpad Toysboaz.info/. Enter Y758 in the search box to learn more about \"DASH Diet: Care Instructions. \" Current as of: September 21, 2016 Content Version: 11.4 © 4389-9955 [employer]. Care instructions adapted under license by [employer] (which disclaims liability or warranty for this information). If you have questions about a medical condition or this instruction, always ask your healthcare professional. [place] [address] any warranty or liability for your use of this information. Allergic Conjunctivitis in Children: Care Instructions Your Care Instructions Allergic conjunctivitis (say \"fnz-IWER-foi-VY-tus\") is an eye problem that many children get. It is often called pinkeye. In pinkeye, the lining of the eyelid and the eye surface become red and swollen. The lining is called the conjunctiva (say \"ddpw-cohc-ME-vuh\"). Pinkeye can be caused by bacteria, a virus, or an allergy. Your child's pinkeye is caused by an allergy. A substance (allergen) triggers a reaction that results in the symptoms. This type of pinkeye cannot be spread from person to person. Your child may have other symptoms of an allergy, such as a runny nose. Allergic pinkeye goes away when you keep your child away from the allergen that triggers the pinkeye. Triggers include pollen, mold, and animal skin cells (dander). But because it is not always possible to stay away from triggers, your doctor may suggest eyedrops to treat the symptoms. Antibiotics do not help with allergies. Follow-up care is a key part of your child's treatment and safety. Be sure to make and go to all appointments, and call your doctor if your child is having problems. It's also a good idea to know your child's test results and keep a list of the medicines your child takes. How can you care for your child at home? Use medicines as directed · Have your child take medicines exactly as prescribed. Call your doctor if you think your child is having a problem with his or her medicine. You will get more details on the specific medicines your doctor prescribes. · If the doctor gave your child eyedrops, use them as directed. Keep the bottle tip clean. To put in eyedrops: ¨ Tilt your child's head back and pull his or her lower eyelid down with one finger. ¨ Drop or squirt the medicine inside the lower lid. ¨ Have your child close the eye for 30 to 60 seconds to let the drops move around. ¨ Do not touch the tip of the bottle to your child's eyelashes or any other surface. Make your child comfortable · Use moist cotton or a clean, wet cloth to remove the crust from your child's eyes. Wipe from the inside corner of the eye to the outside. Use a clean part of the cloth for each wipe. · Put cold or warm wet cloths on your child's eyes a few times a day if the eyes hurt or are itching. · Do not have your child wear contact lenses until the pinkeye is gone. Clean the contacts and storage case. · If your child wears disposable contacts, get out a new pair when the eyes have cleared and it is safe to wear contacts again. Avoid triggers · Try to find what triggers the pinkeye. Then take steps to avoid it. For example: ¨ Control animal dander and other pet allergens by keeping pets only in certain areas of your home. ¨ Avoid outdoor pollens by keeping your child inside while pollen counts are high. ¨ Control indoor mold by cleaning bathtubs and showers monthly. When should you call for help? Call your doctor now or seek immediate medical care if: 
? · Your child has pain in an eye, not just irritation on the surface. ? · Your child has a change in vision or a loss of vision. ? · Pinkeye lasts longer than 7 days. ? Watch closely for changes in your child's health, and be sure to contact your doctor if: 
? · Your child does not get better as expected. Where can you learn more? Go to http://jamie-boaz.info/. Enter M979 in the search box to learn more about \"Allergic Conjunctivitis in Children: Care Instructions. \" Current as of: March 20, 2017 Content Version: 11.4 © 8294-7879 [employer]. Care instructions adapted under license by [employer] (which disclaims liability or warranty for this information). If you have questions about a medical condition or this instruction, always ask your healthcare professional. [place] [address] any warranty or liability for your use of this information. Introducing [facility]! Hamilton Turner introduces [employer] patient portal. Now you can access parts of your medical record, email your doctor's office, and request medication refills online. 1. In your internet browser, go to [email or web address] 2. Click on the First Time User? Click Here link in the Sign In box. You will see the New Member Sign Up page. 3. Enter your Seen Access Code exactly as it appears below. You will not need to use this code after youve completed the sign-up process. If you do not sign up before the expiration date, you must request a new code. · Seen Access Code: [email or web address] Expires: 9/17/2018  2:18 PM 
 
4. Enter the last four digits of your Social Security Number (xxxx) and Date of Birth (mm/dd/yyyy) as indicated and click Submit. You will be taken to the next sign-up page. 5. Create a Seen ID. This will be your Seen login ID and cannot be changed, so think of one that is secure and easy to remember. 6. Create a Seen password. You can change your password at any time. 7. Enter your Password Reset Question and Answer. This can be used at a later time if you forget your password. 8. Enter your e-mail address. You will receive e-mail notification when new information is available in [address] 9. Click Sign Up. You can now view and download portions of your medical record. 10. Click the Download Summary menu link to download a portable copy of your medical information. If you have questions, please visit the Frequently Asked Questions section of the Seen website. Remember, Seen is NOT to be used for urgent needs. For medical emergencies, dial 911. Now available from your iPhone and Android! Please provide this summary of care documentation to your next provider. Your primary care clinician is listed as Kitty Eden. If you have any questions after today's visit, please call [phone].

## 2018-06-19 NOTE — PATIENT INSTRUCTIONS
[address], [place]  Authorization for Invasive Procedures  1.  I hereby authorize Dr. Cary Teresa, my physician and whomever may be designated as the doctor's assistant, to perform the following operation and/or procedure:  Kev Marmolejo DASH Diet: Care Instructions  Your Care Instructions    The DASH diet is an eating plan that can help lower your blood pressure. DASH stands for Dietary Approaches to Stop Hypertension. Hypertension is high blood pressure. The DASH diet focuses on eating foods that are high in calcium, potassium, and magnesium. These nutrients can lower blood pressure. The foods that are highest in these nutrients are fruits, vegetables, low-fat dairy products, nuts, seeds, and legumes. But taking calcium, potassium, and magnesium supplements instead of eating foods that are high in those nutrients does not have the same effect. The DASH diet also includes whole grains, fish, and poultry. The DASH diet is one of several lifestyle changes your doctor may recommend to lower your high blood pressure. Your doctor may also want you to decrease the amount of sodium in your diet. Lowering sodium while following the DASH diet can lower blood pressure even further than just the DASH diet alone. Follow-up care is a key part of your treatment and safety. Be sure to make and go to all appointments, and call your doctor if you are having problems. It's also a good idea to know your test results and keep a list of the medicines you take. How can you care for yourself at home? Following the DASH diet  · Eat 4 to 5 servings of fruit each day. A serving is 1 medium-sized piece of fruit, ½ cup chopped or canned fruit, 1/4 cup dried fruit, or 4 ounces (½ cup) of fruit juice. Choose fruit more often than fruit juice. · Eat 4 to 5 servings of vegetables each day. A serving is 1 cup of lettuce or raw leafy vegetables, ½ cup of chopped or cooked vegetables, or 4 ounces (½ cup) of vegetable juice. Choose vegetables more often than vegetable juice. · Get 2 to 3 servings of low-fat and fat-free dairy each day. A serving is 8 ounces of milk, 1 cup of yogurt, or 1 ½ ounces of cheese. · Eat 6 to 8 servings of grains each day.  A serving is 1 slice of bread, 1 ounce of dry cereal, or ½ cup of cooked rice, pasta, or cooked cereal. Try to choose whole-grain products as much as possible. · Limit lean meat, poultry, and fish to 2 servings each day. A serving is 3 ounces, about the size of a deck of cards. · Eat 4 to 5 servings of nuts, seeds, and legumes (cooked dried beans, lentils, and split peas) each week. A serving is 1/3 cup of nuts, 2 tablespoons of seeds, or ½ cup of cooked beans or peas. · Limit fats and oils to 2 to 3 servings each day. A serving is 1 teaspoon of vegetable oil or 2 tablespoons of salad dressing. · Limit sweets and added sugars to 5 servings or less a week. A serving is 1 tablespoon jelly or jam, ½ cup sorbet, or 1 cup of lemonade. · Eat less than 2,300 milligrams (mg) of sodium a day. If you limit your sodium to 1,500 mg a day, you can lower your blood pressure even more. Tips for success  · Start small. Do not try to make dramatic changes to your diet all at once. You might feel that you are missing out on your favorite foods and then be more likely to not follow the plan. Make small changes, and stick with them. Once those changes become habit, add a few more changes. · Try some of the following:  ¨ Make it a goal to eat a fruit or vegetable at every meal and at snacks. This will make it easy to get the recommended amount of fruits and vegetables each day. ¨ Try yogurt topped with fruit and nuts for a snack or healthy dessert. ¨ Add lettuce, tomato, cucumber, and onion to sandwiches. ¨ Combine a ready-made pizza crust with low-fat mozzarella cheese and lots of vegetable toppings. Try using tomatoes, squash, spinach, broccoli, carrots, cauliflower, and onions. ¨ Have a variety of cut-up vegetables with a low-fat dip as an appetizer instead of chips and dip. ¨ Sprinkle sunflower seeds or chopped almonds over salads. Or try adding chopped walnuts or almonds to cooked vegetables.   ¨ Try some vegetarian meals using beans and performed for the purposes of advancing medicine, science, and/or education, provided my identity is not revealed. If the procedure has been videotaped, the physician/surgeon will obtain the original videotape.  The hospital will not be responsible for stor My signature below affirms that prior to the time of the procedure, I have explained to the patient and/or her legal representative, the risks and benefits involved in the proposed treatment and any reasonable alternative to the proposed treatment.  I have peas. Add garbanzo or kidney beans to salads. Make burritos and tacos with mashed casey beans or black beans. Where can you learn more? Go to http://jamie-boaz.info/. Enter E520 in the search box to learn more about \"DASH Diet: Care Instructions. \"  Current as of: September 21, 2016  Content Version: 11.4  © 9599-9142 [employer]. Care instructions adapted under license by [employer] (which disclaims liability or warranty for this information). If you have questions about a medical condition or this instruction, always ask your healthcare professional. [address] any warranty or liability for your use of this information. Allergic Conjunctivitis in Children: Care Instructions  Your Care Instructions    Allergic conjunctivitis (say \"wsc-HMSY-gcq-VY-tus\") is an eye problem that many children get. It is often called pinkeye. In pinkeye, the lining of the eyelid and the eye surface become red and swollen. The lining is called the conjunctiva (say \"hzpx-jeik-SO-vuh\"). Pinkeye can be caused by bacteria, a virus, or an allergy. Your child's pinkeye is caused by an allergy. A substance (allergen) triggers a reaction that results in the symptoms. This type of pinkeye cannot be spread from person to person. Your child may have other symptoms of an allergy, such as a runny nose. Allergic pinkeye goes away when you keep your child away from the allergen that triggers the pinkeye. Triggers include pollen, mold, and animal skin cells (dander). But because it is not always possible to stay away from triggers, your doctor may suggest eyedrops to treat the symptoms. Antibiotics do not help with allergies. Follow-up care is a key part of your child's treatment and safety. Be sure to make and go to all appointments, and call your doctor if your child is having problems.  It's also a good idea to know your child's test results and keep a list of the medicines your child takes. How can you care for your child at home? Use medicines as directed  · Have your child take medicines exactly as prescribed. Call your doctor if you think your child is having a problem with his or her medicine. You will get more details on the specific medicines your doctor prescribes. · If the doctor gave your child eyedrops, use them as directed. Keep the bottle tip clean. To put in eyedrops:  ¨ Tilt your child's head back and pull his or her lower eyelid down with one finger. ¨ Drop or squirt the medicine inside the lower lid. ¨ Have your child close the eye for 30 to 60 seconds to let the drops move around. ¨ Do not touch the tip of the bottle to your child's eyelashes or any other surface. Make your child comfortable  · Use moist cotton or a clean, wet cloth to remove the crust from your child's eyes. Wipe from the inside corner of the eye to the outside. Use a clean part of the cloth for each wipe. · Put cold or warm wet cloths on your child's eyes a few times a day if the eyes hurt or are itching. · Do not have your child wear contact lenses until the pinkeye is gone. Clean the contacts and storage case. · If your child wears disposable contacts, get out a new pair when the eyes have cleared and it is safe to wear contacts again. Avoid triggers  · Try to find what triggers the pinkeye. Then take steps to avoid it. For example:  ¨ Control animal dander and other pet allergens by keeping pets only in certain areas of your home. ¨ Avoid outdoor pollens by keeping your child inside while pollen counts are high. ¨ Control indoor mold by cleaning bathtubs and showers monthly. When should you call for help? Call your doctor now or seek immediate medical care if:  ? · Your child has pain in an eye, not just irritation on the surface. ? · Your child has a change in vision or a loss of vision. ? · Pinkeye lasts longer than 7 days. ? Watch closely for changes in your child's health, and be sure to contact your doctor if:  ? · Your child does not get better as expected. Where can you learn more? Go to http://jamie-boaz.info/. Enter T640 in the search box to learn more about \"Allergic Conjunctivitis in Children: Care Instructions. \"  Current as of: March 20, 2017  Content Version: 11.4  © 3313-1092 [employer]. Care instructions adapted under license by [employer] (which disclaims liability or warranty for this information). If you have questions about a medical condition or this instruction, always ask your healthcare professional. [place] [address] any warranty or liability for your use of this information.

## 2018-06-19 NOTE — PROGRESS NOTES
HISTORY OF PRESENT ILLNESS  Mulu Campbell is a 48 y.o. female. HPI Comments: Seen in follow-up for GERD, pedal edema. No problems with medications. She was able to stop smoking on Wellbutrin! . Her weight and BP are up today, however. Vascular says her edema is not due to veins. Concerned about red, itchy eyes since yesterday, associated with nasal congestion. She had trouble with this as a child but not recently. [place]. She used Visine without effect. Obesity   Pertinent negatives include no chest pain, no headaches and no shortness of breath. Past Medical History:   Diagnosis Date    GERD (gastroesophageal reflux disease)     Musculoskeletal disorder        Past Surgical History:   Procedure Laterality Date    HX CERVICAL FUSION      plated for fracture    HX KNEE ARTHROSCOPY Right medial meniscus tear    HX ORTHOPAEDIC Right ACL resection       History   Smoking Status    Former Smoker    Quit date: 2/5/2018   Smokeless Tobacco    Never Used     Current Outpatient Prescriptions   Medication Sig    furosemide (LASIX) 40 mg tablet TAKE 1 TABLET BY MOUTH ONCE DAILY FOR EDEMA    naproxen sodium (ALEVE) 220 mg tablet Take 220 mg by mouth as needed.  Omeprazole delayed release (PRILOSEC D/R) 20 mg tablet Take 1 Tab by mouth daily. Indications: gastroesophageal reflux disease    MV-Min-Folic Acid-Lutein (CENTRUM SILVER) 500-250 mcg chew Take  by mouth. No current facility-administered medications for this visit. Review of Systems   Constitutional: Negative for chills and fever. HENT: Positive for congestion. Eyes: Positive for redness. Respiratory: Negative for shortness of breath. Cardiovascular: Positive for leg swelling. Negative for chest pain and palpitations. Gastrointestinal: Negative for nausea and vomiting. Neurological: Negative for tingling, focal weakness and headaches.    Psychiatric/Behavioral: The patient does not have insomnia. Visit Vitals    BP (!) 147/94 (BP 1 Location: Right arm, BP Patient Position: Sitting)    Pulse 73    Temp 98 °F (36.7 °C) (Oral)    Resp 18    Ht 5' 4\" (1.626 m)    Wt 223 lb (101.2 kg)    SpO2 96%    BMI 38.28 kg/m2       Physical Exam   Constitutional: She is oriented to person, place, and time. She appears well-developed and well-nourished. No distress. Eyes: EOM are normal. Pupils are equal, round, and reactive to light. Right eye exhibits no discharge. Left eye exhibits no discharge. Redness, right>left without discharge   Neck: Neck supple. No thyromegaly present. Carotid bruit absent   Cardiovascular: Normal rate, regular rhythm and intact distal pulses. Exam reveals no gallop and no friction rub. No murmur heard. Pulmonary/Chest: Effort normal and breath sounds normal. No respiratory distress. Musculoskeletal: She exhibits no edema. Lymphadenopathy:     She has no cervical adenopathy. Neurological: She is alert and oriented to person, place, and time. Skin: Skin is warm and dry. Psychiatric: She has a normal mood and affect. Her behavior is normal. Judgment and thought content normal.   Nursing note and vitals reviewed. ASSESSMENT and PLAN    ICD-10-CM ICD-9-CM    1. Essential hypertension I10 401.9    2. Gastroesophageal reflux disease without esophagitis K21.9 530.81    3. Class 2 obesity due to excess calories without serious comorbidity with body mass index (BMI) of 37.0 to 37.9 in adult E66.09 278.00     Z68.37 V85.37    4. Lymphedema of both lower extremities I89.0 457.1    5. Allergic conjunctivitis of both eyes H10.13 372.14      Follow-up Disposition:  Return in about 3 months (around 9/19/2018). the following changes in treatment are made: DASH diet discussed. She will use OTC oral antihistamine and Visine Allergy drops (I suspected she is no longer exposed to the offending allergen).   Plan of care reviewed - patient verbalize(s) understanding and agreement.

## 2019-07-26 ENCOUNTER — OFFICE VISIT (OUTPATIENT)
Dept: FAMILY MEDICINE CLINIC | Facility: CLINIC | Age: 54
End: 2019-07-26

## 2019-07-26 VITALS
OXYGEN SATURATION: 97 % | HEIGHT: 64 IN | TEMPERATURE: 97.6 F | BODY MASS INDEX: 35.48 KG/M2 | RESPIRATION RATE: 16 BRPM | WEIGHT: 207.8 LBS | SYSTOLIC BLOOD PRESSURE: 142 MMHG | HEART RATE: 86 BPM | DIASTOLIC BLOOD PRESSURE: 86 MMHG

## 2019-07-26 DIAGNOSIS — Z12.11 COLON CANCER SCREENING: ICD-10-CM

## 2019-07-26 DIAGNOSIS — Z78.0 MENOPAUSE: ICD-10-CM

## 2019-07-26 DIAGNOSIS — I10 ESSENTIAL HYPERTENSION: Primary | ICD-10-CM

## 2019-07-26 DIAGNOSIS — Z13.29 SCREENING FOR THYROID DISORDER: ICD-10-CM

## 2019-07-26 DIAGNOSIS — Z13.220 ENCOUNTER FOR LIPID SCREENING FOR CARDIOVASCULAR DISEASE: ICD-10-CM

## 2019-07-26 DIAGNOSIS — Z13.29 SCREENING FOR HYPOTHYROIDISM: ICD-10-CM

## 2019-07-26 DIAGNOSIS — Z71.6 ENCOUNTER FOR SMOKING CESSATION COUNSELING: ICD-10-CM

## 2019-07-26 DIAGNOSIS — E55.9 VITAMIN D DEFICIENCY: ICD-10-CM

## 2019-07-26 DIAGNOSIS — Z12.39 BREAST CANCER SCREENING: ICD-10-CM

## 2019-07-26 DIAGNOSIS — Z13.6 ENCOUNTER FOR LIPID SCREENING FOR CARDIOVASCULAR DISEASE: ICD-10-CM

## 2019-07-26 DIAGNOSIS — R60.0 PEDAL EDEMA: ICD-10-CM

## 2019-07-26 DIAGNOSIS — Z72.0 TOBACCO ABUSE: ICD-10-CM

## 2019-07-26 DIAGNOSIS — K21.9 GASTROESOPHAGEAL REFLUX DISEASE WITHOUT ESOPHAGITIS: ICD-10-CM

## 2019-07-26 DIAGNOSIS — E66.01 SEVERE OBESITY (HCC): ICD-10-CM

## 2019-07-26 RX ORDER — BUPROPION HYDROCHLORIDE 150 MG/1
TABLET, EXTENDED RELEASE ORAL
Qty: 63 TAB | Refills: 1 | Status: SHIPPED | OUTPATIENT
Start: 2019-07-26 | End: 2021-08-31 | Stop reason: ALTCHOICE

## 2019-07-26 RX ORDER — FUROSEMIDE 40 MG/1
TABLET ORAL
Qty: 60 TAB | Refills: 0 | Status: SHIPPED | OUTPATIENT
Start: 2019-07-26 | End: 2019-09-05 | Stop reason: SDUPTHER

## 2019-07-26 RX ORDER — OMEPRAZOLE 20 MG/1
CAPSULE, DELAYED RELEASE ORAL
Qty: 60 CAP | Refills: 0 | Status: SHIPPED | OUTPATIENT
Start: 2019-07-26 | End: 2019-09-05 | Stop reason: SDUPTHER

## 2019-07-26 NOTE — PROGRESS NOTES
Kayla Duff is a 48 y.o. female presents in office for    Chief Complaint   Patient presents with    Physical    Nicotine Dependence     Pt requesting to restart wellbutrin        Visit Vitals  /86 (BP 1 Location: Left arm, BP Patient Position: Sitting)   Pulse 86   Temp 97.6 °F (36.4 °C) (Oral)   Resp 16   Ht 5' 4\" (1.626 m)   Wt 207 lb 12.8 oz (94.3 kg)   SpO2 97%   BMI 35.67 kg/m²         Health Maintenance Due   Topic Date Due    Shingrix Vaccine Age 50> (1 of 2) 04/17/2017    FOBT Q 1 YEAR AGE 50-75  04/17/2017    BREAST CANCER SCRN MAMMOGRAM  05/01/2017    PAP AKA CERVICAL CYTOLOGY  10/20/2018         1. Have you been to the ER, urgent care clinic since your last visit? Hospitalized since your last visit? No    2. Have you seen or consulted any other health care providers outside of the [address] [place] [address] since your last visit? Include any pap smears or colon screening. No     3 most recent PHQ Screens 7/26/2019   Little interest or pleasure in doing things Not at all   Feeling down, depressed, irritable, or hopeless Not at all   Total Score PHQ 2 0     Abuse Screening Questionnaire 7/26/2019   Do you ever feel afraid of your partner? N   Are you in a relationship with someone who physically or mentally threatens you? N   Is it safe for you to go home? Y     Fall Risk Assessment, last 12 mths 7/26/2019   Able to walk? Yes   Fall in past 12 months?  No     Learning Assessment 6/19/2018   PRIMARY LEARNER Patient   CO-LEARNER CAREGIVER -   PRIMARY LANGUAGE ENGLISH   LEARNER PREFERENCE PRIMARY DEMONSTRATION     READING   ANSWERED BY patient   RELATIONSHIP SELF

## 2019-07-26 NOTE — PROGRESS NOTES
Tripp South is a 48 y.o. female presenting today for Nicotine Dependence (Pt requesting to restart wellbutrin) and Hypertension  . HPI:  Tripp South presents to the office today to establish care with the practice. Patient has a past medical history for hypertension, tobacco abuse, lymphedema and GERD. Patient notes that she currently is not taking any medication for hypertension. Her blood pressure today is 142/86. She is negative for any chest pain, palpitation or cough. Patient also has a history of lymphedema. She notes she currently takes Lasix 40 mg daily for her lymphedema and is requesting a refill today. Per the patient she previously has stopped smoking tobacco.  She reports that she has been hanging out with friends that smoke regularly and has started craving cigarettes. The patient would like to resume her Wellbutrin  mg twice daily and is requesting a refill. Patient is asked to follow-up in 2 months to see if her smoking sensation will decrease her blood pressure patient is currently taking omeprazole 20 mg capsule for her GERD and notes that her symptoms are controlled. Review of Systems   Respiratory: Negative for cough, shortness of breath and wheezing. Cardiovascular: Positive for leg swelling. Negative for chest pain and palpitations. Gastrointestinal: Negative for abdominal pain, constipation, diarrhea, nausea and vomiting. Musculoskeletal: Negative for back pain, myalgias and neck pain. Neurological: Negative for dizziness and headaches.        No Known Allergies    Current Outpatient Medications   Medication Sig Dispense Refill    omeprazole (PRILOSEC) 20 mg capsule TAKE 1 CAPSULE BY MOUTH DAILY 60 Cap 0    furosemide (LASIX) 40 mg tablet TAKE 1 TABLET BY MOUTH ONCE DAILY FOR EDEMA 60 Tab 0    buPROPion SR (WELLBUTRIN SR) 150 mg SR tablet Take 1 tablet by mouth x 3 days then take 1 tablet by mouth twice chaylly 63 Tab 1    MV-Min-Folic Acid-Lutein (CENTRUM SILVER) 500-250 mcg chew Take  by mouth.  naproxen sodium (ALEVE) 220 mg tablet Take 220 mg by mouth as needed. Past Medical History:   Diagnosis Date    GERD (gastroesophageal reflux disease)     Musculoskeletal disorder        Past Surgical History:   Procedure Laterality Date    HX CERVICAL FUSION      plated for fracture    HX KNEE ARTHROSCOPY Right medial meniscus tear    HX ORTHOPAEDIC Right ACL resection       Social History     Socioeconomic History    Marital status: SINGLE     Spouse name: Not on file    Number of children: Not on file    Years of education: Not on file    Highest education level: Not on file   Occupational History    Not on file   Social Needs    Financial resource strain: Not on file    Food insecurity:     Worry: Not on file     Inability: Not on file    Transportation needs:     Medical: Not on file     Non-medical: Not on file   Tobacco Use    Smoking status: Former Smoker     Last attempt to quit: 2018     Years since quittin.4    Smokeless tobacco: Never Used   Substance and Sexual Activity    Alcohol use:  Yes    Drug use: No    Sexual activity: Not Currently   Lifestyle    Physical activity:     Days per week: Not on file     Minutes per session: Not on file    Stress: Not on file   Relationships    Social connections:     Talks on phone: Not on file     Gets together: Not on file     Attends Shinto service: Not on file     Active member of club or organization: Not on file     Attends meetings of clubs or organizations: Not on file     Relationship status: Not on file    Intimate partner violence:     Fear of current or ex partner: Not on file     Emotionally abused: Not on file     Physically abused: Not on file     Forced sexual activity: Not on file   Other Topics Concern    Not on file   Social History Narrative    Not on file       Patient does not have an advanced directive on file    Vitals:    19 1556   BP: 142/86   Pulse: 86   Resp: 16   Temp: 97.6 °F (36.4 °C)   TempSrc: Oral   SpO2: 97%   Weight: 207 lb 12.8 oz (94.3 kg)   Height: 5' 4\" (1.626 m)   PainSc:   0 - No pain       Physical Exam   Constitutional: She is oriented to person, place, and time. No distress. Cardiovascular: Normal rate and normal heart sounds. Pulmonary/Chest: Effort normal and breath sounds normal.   Musculoskeletal: She exhibits edema (lymphedema). She exhibits no tenderness. Neurological: She is oriented to person, place, and time. Nursing note and vitals reviewed. No visits with results within 3 Month(s) from this visit. Latest known visit with results is:   Hospital Outpatient Visit on 04/20/2016   Component Date Value Ref Range Status    Sodium 04/20/2016 141  136 - 145 mmol/L Final    Potassium 04/20/2016 3.9  3.5 - 5.5 mmol/L Final    Chloride 04/20/2016 105  100 - 108 mmol/L Final    CO2 04/20/2016 26  21 - 32 mmol/L Final    Anion gap 04/20/2016 10  3.0 - 18 mmol/L Final    Glucose 04/20/2016 94  74 - 99 mg/dL Final    BUN 04/20/2016 8  7.0 - 18 MG/DL Final    Creatinine 04/20/2016 0.51* 0.6 - 1.3 MG/DL Final    BUN/Creatinine ratio 04/20/2016 16  12 - 20   Final    GFR est AA 04/20/2016 >60  >60 ml/min/1.73m2 Final    GFR est non-AA 04/20/2016 >60  >60 ml/min/1.73m2 Final    Comment: (NOTE)  Estimated GFR is calculated using the Modification of Diet in Renal   Disease (MDRD) Study equation, reported for both  Americans   (GFRAA) and non- Americans (GFRNA), and normalized to 1.73m2   body surface area. The physician must decide which value applies to   the patient. The MDRD study equation should only be used in   individuals age 25 or older. It has not been validated for the   following: pregnant women, patients with serious comorbid conditions,   or on certain medications, or persons with extremes of body size,   muscle mass, or nutritional status.       Calcium 04/20/2016 8.6  8.5 - 10.1 MG/DL Final       .No results found for any visits on 07/26/19. Assessment / Plan:      ICD-10-CM ICD-9-CM    1. Essential hypertension I10 401.9    2. Gastroesophageal reflux disease without esophagitis K21.9 530.81 omeprazole (PRILOSEC) 20 mg capsule   3. Pedal edema R60.0 782.3 furosemide (LASIX) 40 mg tablet   4. Severe obesity ([address] E66.01 278.01    5. Encounter for lipid screening for cardiovascular disease Z13.220 V77.91 CBC WITH AUTOMATED DIFF    Z13.6 V81.2 LIPID PANEL      METABOLIC PANEL, COMPREHENSIVE   6. Screening for hypothyroidism Z13.29 V77.0    7. Screening for thyroid disorder Z13.29 V77.0 TSH 3RD GENERATION   8. Vitamin D deficiency E55.9 268.9 VITAMIN D, 25 HYDROXY   9. Menopause Z78.0 627.2 DEXA BONE DENSITY STUDY AXIAL   10. Breast cancer screening Z12.31 V76.10 MAI MAMMO BI SCREENING INCL CAD   6. Colon cancer screening Z12.11 V76.51 REFERRAL TO GASTROENTEROLOGY   12. Encounter for smoking cessation counseling Z71.6 V65.42 buPROPion SR (WELLBUTRIN SR) 150 mg SR tablet     305.1    13. Tobacco abuse Z72.0 305.1      Fasting labs ordered  Screening for mammogram, and bone density ordered  Refilled omeprazole and Lasix prescription  Patient started on Wellbutrin  mg tablet twice daily  Patient asked to follow-up prior to the end of August.  Referral was placed for gastroenterology for colon cancer screening    Follow-up and Dispositions    · Return in about 2 months (around 9/26/2019) for physical with pap. I asked the patient if she  had any questions and answered her  questions. The patient stated that she understands the treatment plan and agrees with the treatment plan    This document was created with a voice activated dictation system and may contain transcription errors.

## 2019-08-20 ENCOUNTER — HOSPITAL ENCOUNTER (OUTPATIENT)
Dept: LAB | Age: 54
Discharge: HOME | End: 2019-08-20
Payer: COMMERCIAL

## 2019-08-20 DIAGNOSIS — E55.9 VITAMIN D DEFICIENCY: ICD-10-CM

## 2019-08-20 DIAGNOSIS — Z13.6 ENCOUNTER FOR LIPID SCREENING FOR CARDIOVASCULAR DISEASE: ICD-10-CM

## 2019-08-20 DIAGNOSIS — Z13.220 ENCOUNTER FOR LIPID SCREENING FOR CARDIOVASCULAR DISEASE: ICD-10-CM

## 2019-08-20 DIAGNOSIS — Z13.29 SCREENING FOR THYROID DISORDER: ICD-10-CM

## 2019-08-20 LAB
ALBUMIN SERPL-MCNC: 3.1 G/DL (ref 3.4–5)
ALBUMIN/GLOB SERPL: 1.1 {RATIO} (ref 0.8–1.7)
ALP SERPL-CCNC: 92 U/L (ref 45–117)
ALT SERPL-CCNC: 30 U/L (ref 13–56)
ANION GAP SERPL CALC-SCNC: 4 MMOL/L (ref 3–18)
AST SERPL-CCNC: 15 U/L (ref 10–38)
BASOPHILS # BLD: 0 K/UL (ref 0–0.1)
BASOPHILS NFR BLD: 0 % (ref 0–2)
BILIRUB SERPL-MCNC: 0.3 MG/DL (ref 0.2–1)
BUN SERPL-MCNC: 14 MG/DL (ref 7–18)
BUN/CREAT SERPL: 17 (ref 12–20)
CALCIUM SERPL-MCNC: 9.1 MG/DL (ref 8.5–10.1)
CHLORIDE SERPL-SCNC: 106 MMOL/L (ref 100–111)
CO2 SERPL-SCNC: 31 MMOL/L (ref 21–32)
CREAT SERPL-MCNC: 0.81 MG/DL (ref 0.6–1.3)
DIFFERENTIAL METHOD BLD: ABNORMAL
EOSINOPHIL # BLD: 0.2 K/UL (ref 0–0.4)
EOSINOPHIL NFR BLD: 3 % (ref 0–5)
ERYTHROCYTE [DISTWIDTH] IN BLOOD BY AUTOMATED COUNT: 14.9 % (ref 11.6–14.5)
GLOBULIN SER CALC-MCNC: 2.7 G/DL (ref 2–4)
GLUCOSE SERPL-MCNC: 117 MG/DL (ref 74–99)
HCT VFR BLD AUTO: 48.4 % (ref 35–45)
HGB BLD-MCNC: 15.5 G/DL (ref 12–16)
LYMPHOCYTES # BLD: 1 K/UL (ref 0.9–3.6)
LYMPHOCYTES NFR BLD: 14 % (ref 21–52)
MCH RBC QN AUTO: 31.4 PG (ref 24–34)
MCHC RBC AUTO-ENTMCNC: 32 G/DL (ref 31–37)
MCV RBC AUTO: 98 FL (ref 74–97)
MONOCYTES # BLD: 0.5 K/UL (ref 0.05–1.2)
MONOCYTES NFR BLD: 7 % (ref 3–10)
NEUTS SEG # BLD: 5.2 K/UL (ref 1.8–8)
NEUTS SEG NFR BLD: 76 % (ref 40–73)
PLATELET # BLD AUTO: 345 K/UL (ref 135–420)
PMV BLD AUTO: 10.5 FL (ref 9.2–11.8)
POTASSIUM SERPL-SCNC: 4.9 MMOL/L (ref 3.5–5.5)
PROT SERPL-MCNC: 5.8 G/DL (ref 6.4–8.2)
RBC # BLD AUTO: 4.94 M/UL (ref 4.2–5.3)
SODIUM SERPL-SCNC: 141 MMOL/L (ref 136–145)
TSH SERPL DL<=0.05 MIU/L-ACNC: 4.27 UIU/ML (ref 0.36–3.74)
WBC # BLD AUTO: 6.8 K/UL (ref 4.6–13.2)

## 2019-08-20 PROCEDURE — 80061 LIPID PANEL: CPT

## 2019-08-20 PROCEDURE — 36415 COLL VENOUS BLD VENIPUNCTURE: CPT

## 2019-08-20 PROCEDURE — 84443 ASSAY THYROID STIM HORMONE: CPT

## 2019-08-20 PROCEDURE — 80053 COMPREHEN METABOLIC PANEL: CPT

## 2019-08-20 PROCEDURE — 85025 COMPLETE CBC W/AUTO DIFF WBC: CPT

## 2019-08-20 PROCEDURE — 82306 VITAMIN D 25 HYDROXY: CPT

## 2019-08-21 LAB
25(OH)D3 SERPL-MCNC: 34.7 NG/ML (ref 30–100)
CHOLEST SERPL-MCNC: 286 MG/DL
HDLC SERPL-MCNC: 71 MG/DL (ref 40–60)
HDLC SERPL: 4 {RATIO} (ref 0–5)
LDLC SERPL CALC-MCNC: 182.6 MG/DL (ref 0–100)
LIPID PROFILE,FLP: ABNORMAL
TRIGL SERPL-MCNC: 162 MG/DL (ref ?–150)
VLDLC SERPL CALC-MCNC: 32.4 MG/DL

## 2019-08-29 NOTE — PROGRESS NOTES
Please let the patient know her cholesterol panel is elevated and her glucose is mildly elevated. We will discuss the labs in detail during her visit. Please ask her to keep this appointment because she will need additional medications.

## 2019-09-05 ENCOUNTER — OFFICE VISIT (OUTPATIENT)
Dept: FAMILY MEDICINE CLINIC | Facility: CLINIC | Age: 54
End: 2019-09-05

## 2019-09-05 ENCOUNTER — HOSPITAL ENCOUNTER (OUTPATIENT)
Dept: LAB | Age: 54
Discharge: HOME | End: 2019-09-05
Payer: COMMERCIAL

## 2019-09-05 VITALS
DIASTOLIC BLOOD PRESSURE: 82 MMHG | TEMPERATURE: 96.3 F | OXYGEN SATURATION: 99 % | SYSTOLIC BLOOD PRESSURE: 133 MMHG | HEART RATE: 79 BPM | WEIGHT: 206.6 LBS | RESPIRATION RATE: 16 BRPM | BODY MASS INDEX: 35.27 KG/M2 | HEIGHT: 64 IN

## 2019-09-05 DIAGNOSIS — Z00.00 ENCOUNTER FOR PHYSICAL EXAMINATION: Primary | ICD-10-CM

## 2019-09-05 DIAGNOSIS — Z23 ENCOUNTER FOR IMMUNIZATION: ICD-10-CM

## 2019-09-05 DIAGNOSIS — Z01.419 ENCOUNTER FOR CERVICAL PAP SMEAR WITH PELVIC EXAM: ICD-10-CM

## 2019-09-05 DIAGNOSIS — Z12.4 CERVICAL CANCER SCREENING: ICD-10-CM

## 2019-09-05 DIAGNOSIS — R60.0 PEDAL EDEMA: ICD-10-CM

## 2019-09-05 DIAGNOSIS — E78.2 MIXED HYPERLIPIDEMIA: ICD-10-CM

## 2019-09-05 DIAGNOSIS — K21.9 GASTROESOPHAGEAL REFLUX DISEASE WITHOUT ESOPHAGITIS: ICD-10-CM

## 2019-09-05 PROCEDURE — 88175 CYTOPATH C/V AUTO FLUID REDO: CPT

## 2019-09-05 PROCEDURE — 87624 HPV HI-RISK TYP POOLED RSLT: CPT

## 2019-09-05 RX ORDER — OMEPRAZOLE 20 MG/1
CAPSULE, DELAYED RELEASE ORAL
Qty: 60 CAP | Refills: 0 | Status: SHIPPED | OUTPATIENT
Start: 2019-09-05 | End: 2022-08-31 | Stop reason: SDUPTHER

## 2019-09-05 RX ORDER — FUROSEMIDE 40 MG/1
TABLET ORAL
Qty: 60 TAB | Refills: 0 | Status: SHIPPED | OUTPATIENT
Start: 2019-09-05 | End: 2019-11-25 | Stop reason: SDUPTHER

## 2019-09-05 RX ORDER — ATORVASTATIN CALCIUM 20 MG/1
20 TABLET, FILM COATED ORAL DAILY
Qty: 30 TAB | Refills: 3 | Status: SHIPPED | OUTPATIENT
Start: 2019-09-05 | End: 2020-01-14

## 2019-09-05 NOTE — PROGRESS NOTES
Jayden Sánchez is a 48 y.o. female presenting today for Physical (pap)  . HPI:  Jayden Sánchez presents to the office today for complete physical examination. Prior to today's visit patient had fasting labs completed. Lab results reviewed with patient. Patient notes she has been feeling well. She notes she has stopped smoking with the help of taking Wellbutrin and has now stopped taking the medication. She reports her blood pressure has improved after stopping Wellbutrin. She presents today requesting a pap smear. She was given a referral for colonoscopy and mammogram but notes she has not been scheduled yet. Review of Systems   Constitutional: Negative for malaise/fatigue. HENT: Negative for congestion, ear pain and sore throat. Respiratory: Negative for cough, sputum production and shortness of breath. Cardiovascular: Negative for chest pain, palpitations and leg swelling. Gastrointestinal: Negative for abdominal pain, diarrhea, nausea and vomiting. Musculoskeletal: Negative for myalgias. Neurological: Negative for dizziness, weakness and headaches. No Known Allergies    Current Outpatient Medications   Medication Sig Dispense Refill    furosemide (LASIX) 40 mg tablet TAKE 1 TABLET BY MOUTH ONCE DAILY FOR EDEMA 60 Tab 0    omeprazole (PRILOSEC) 20 mg capsule TAKE 1 CAPSULE BY MOUTH DAILY 60 Cap 0    atorvastatin (LIPITOR) 20 mg tablet Take 1 Tab by mouth daily. 30 Tab 3    naproxen sodium (ALEVE) 220 mg tablet Take 220 mg by mouth as needed.  MV-Min-Folic Acid-Lutein (CENTRUM SILVER) 500-250 mcg chew Take  by mouth.       buPROPion SR (WELLBUTRIN SR) 150 mg SR tablet Take 1 tablet by mouth x 3 days then take 1 tablet by mouth twice dailly 61 Tab 1       Past Medical History:   Diagnosis Date    GERD (gastroesophageal reflux disease)     Musculoskeletal disorder        Past Surgical History:   Procedure Laterality Date    HX CERVICAL FUSION      plated for fracture  HX KNEE ARTHROSCOPY Right medial meniscus tear    HX ORTHOPAEDIC Right ACL resection       Social History     Socioeconomic History    Marital status: SINGLE     Spouse name: Not on file    Number of children: Not on file    Years of education: Not on file    Highest education level: Not on file   Occupational History    Not on file   Social Needs    Financial resource strain: Not on file    Food insecurity:     Worry: Not on file     Inability: Not on file    Transportation needs:     Medical: Not on file     Non-medical: Not on file   Tobacco Use    Smoking status: Former Smoker     Last attempt to quit: 2018     Years since quittin.5    Smokeless tobacco: Never Used   Substance and Sexual Activity    Alcohol use: Yes    Drug use: No    Sexual activity: Not Currently   Lifestyle    Physical activity:     Days per week: Not on file     Minutes per session: Not on file    Stress: Not on file   Relationships    Social connections:     Talks on phone: Not on file     Gets together: Not on file     Attends Confucianism service: Not on file     Active member of club or organization: Not on file     Attends meetings of clubs or organizations: Not on file     Relationship status: Not on file    Intimate partner violence:     Fear of current or ex partner: Not on file     Emotionally abused: Not on file     Physically abused: Not on file     Forced sexual activity: Not on file   Other Topics Concern    Not on file   Social History Narrative    Not on file       Patient does not have an advanced directive on file    Vitals:    19 0836   BP: 133/82   Pulse: 79   Resp: 16   Temp: 96.3 °F (35.7 °C)   TempSrc: Oral   SpO2: 99%   Weight: 206 lb 9.6 oz (93.7 kg)   Height: 5' 4\" (1.626 m)   PainSc:   0 - No pain       Physical Exam   Constitutional: She is oriented to person, place, and time. No distress.    HENT:   Right Ear: External ear normal.   Left Ear: External ear normal.   Mouth/Throat: No oropharyngeal exudate. Neck: Normal range of motion. Neck supple. No thyromegaly present. Cardiovascular: Normal rate, regular rhythm and normal heart sounds. Pulmonary/Chest: Effort normal and breath sounds normal.   Abdominal: Soft. Genitourinary: Vagina normal, cervix normal, right adnexa normal and left adnexa normal. No vaginal discharge found. Lymphadenopathy:     She has no cervical adenopathy. Neurological: She is alert and oriented to person, place, and time. No cranial nerve deficit. Coordination normal.   Vitals reviewed. Hospital Outpatient Visit on 08/20/2019   Component Date Value Ref Range Status    WBC 08/20/2019 6.8  4.6 - 13.2 K/uL Final    RBC 08/20/2019 4.94  4.20 - 5.30 M/uL Final    HGB 08/20/2019 15.5  12.0 - 16.0 g/dL Final    HCT 08/20/2019 48.4* 35.0 - 45.0 % Final    MCV 08/20/2019 98.0* 74.0 - 97.0 FL Final    MCH 08/20/2019 31.4  24.0 - 34.0 PG Final    MCHC 08/20/2019 32.0  31.0 - 37.0 g/dL Final    RDW 08/20/2019 14.9* 11.6 - 14.5 % Final    PLATELET 05/01/8296 986  135 - 420 K/uL Final    MPV 08/20/2019 10.5  9.2 - 11.8 FL Final    NEUTROPHILS 08/20/2019 76* 40 - 73 % Final    LYMPHOCYTES 08/20/2019 14* 21 - 52 % Final    MONOCYTES 08/20/2019 7  3 - 10 % Final    EOSINOPHILS 08/20/2019 3  0 - 5 % Final    BASOPHILS 08/20/2019 0  0 - 2 % Final    ABS. NEUTROPHILS 08/20/2019 5.2  1.8 - 8.0 K/UL Final    ABS. LYMPHOCYTES 08/20/2019 1.0  0.9 - 3.6 K/UL Final    ABS. MONOCYTES 08/20/2019 0.5  0.05 - 1.2 K/UL Final    ABS. EOSINOPHILS 08/20/2019 0.2  0.0 - 0.4 K/UL Final    ABS. BASOPHILS 08/20/2019 0.0  0.0 - 0.1 K/UL Final    DF 08/20/2019 AUTOMATED    Final    LIPID PROFILE 08/20/2019        Final    Cholesterol, total 08/20/2019 286* <200 MG/DL Final    Triglyceride 08/20/2019 162* <150 MG/DL Final    Comment: The drugs N-acetylcysteine (NAC) and  Metamiszole have been found to cause falsely  low results in this chemical assay.  Please  be sure to submit blood samples obtained  BEFORE administration of either of these  drugs to assure correct results.  HDL Cholesterol 08/20/2019 71* 40 - 60 MG/DL Final    LDL, calculated 08/20/2019 182.6* 0 - 100 MG/DL Final    VLDL, calculated 08/20/2019 32.4  MG/DL Final    CHOL/HDL Ratio 08/20/2019 4.0  0 - 5.0   Final    Sodium 08/20/2019 141  136 - 145 mmol/L Final    Potassium 08/20/2019 4.9  3.5 - 5.5 mmol/L Final    Chloride 08/20/2019 106  100 - 111 mmol/L Final    CO2 08/20/2019 31  21 - 32 mmol/L Final    Anion gap 08/20/2019 4  3.0 - 18 mmol/L Final    Glucose 08/20/2019 117* 74 - 99 mg/dL Final    BUN 08/20/2019 14  7.0 - 18 MG/DL Final    Creatinine 08/20/2019 0.81  0.6 - 1.3 MG/DL Final    BUN/Creatinine ratio 08/20/2019 17  12 - 20   Final    GFR est AA 08/20/2019 >60  >60 ml/min/1.73m2 Final    GFR est non-AA 08/20/2019 >60  >60 ml/min/1.73m2 Final    Comment: (NOTE)  Estimated GFR is calculated using the Modification of Diet in Renal   Disease (MDRD) Study equation, reported for both  Americans   (GFRAA) and non- Americans (GFRNA), and normalized to 1.73m2   body surface area. The physician must decide which value applies to   the patient. The MDRD study equation should only be used in   individuals age 25 or older. It has not been validated for the   following: pregnant women, patients with serious comorbid conditions,   or on certain medications, or persons with extremes of body size,   muscle mass, or nutritional status.  Calcium 08/20/2019 9.1  8.5 - 10.1 MG/DL Final    Bilirubin, total 08/20/2019 0.3  0.2 - 1.0 MG/DL Final    ALT (SGPT) 08/20/2019 30  13 - 56 U/L Final    AST (SGOT) 08/20/2019 15  10 - 38 U/L Final    Alk.  phosphatase 08/20/2019 92  45 - 117 U/L Final    Protein, total 08/20/2019 5.8* 6.4 - 8.2 g/dL Final    Albumin 08/20/2019 3.1* 3.4 - 5.0 g/dL Final    Globulin 08/20/2019 2.7  2.0 - 4.0 g/dL Final    A-G Ratio 08/20/2019 1.1  0.8 - 1.7 Final    TSH 08/20/2019 4.27* 0.36 - 3.74 uIU/mL Final    Vitamin D 25-Hydroxy 08/20/2019 34.7  30 - 100 ng/mL Final    Comment: (NOTE)  Deficiency               <20 ng/mL  Insufficiency          20-30 ng/mL  Sufficient             ng/mL  Possible toxicity       >100 ng/mL    The Method used is Siemens Advia Centaur currently standardized to a   Center of Disease Control and Prevention (CDC) certified reference   [facility]. Samples containing fluorescein dye can produce falsely   elevated values when tested with the ADVIA Centaur Vitamin D Assay. It is recommended that results in the toxic range, >100 ng/mL, be   retested 72 hours post fluorescein exposure. .No results found for any visits on 09/05/19. Assessment / Plan:      ICD-10-CM ICD-9-CM    1. Encounter for physical examination Z00.00 V70.9 AMB POC EKG ROUTINE W/ 12 LEADS, INTER & REP   2. Pedal edema R60.0 782.3 furosemide (LASIX) 40 mg tablet   3. Gastroesophageal reflux disease without esophagitis K21.9 530.81 omeprazole (PRILOSEC) 20 mg capsule   4. Mixed hyperlipidemia E78.2 272.2 atorvastatin (LIPITOR) 20 mg tablet      LIPID PANEL      METABOLIC PANEL, COMPREHENSIVE   5. Cervical cancer screening Z12.4 V76.2 PAP IG, APTIMA HPV AND RFX 16/18,45 (028790)   6. Encounter for cervical Pap smear with pelvic exam Z01.419 V76.2      V72.31      EKG- normal EKG. Pap completed  Starting Lipitor   Refilled Lasix and Prilosec rx  F/u in 6 months with fasting labs    Follow-up and Dispositions    · Return in about 6 months (around 3/5/2020). I asked the patient if she  had any questions and answered her  questions. The patient stated that she understands the treatment plan and agrees with the treatment plan    This document was created with a voice activated dictation system and may contain transcription errors. Discussed the patient's BMI with her.   The BMI follow up plan is as follows:     dietary management education, guidance, and counseling  encourage exercise  monitor weight  prescribed dietary intake    An After Visit Summary was printed and given to the patient.

## 2019-09-05 NOTE — PATIENT INSTRUCTIONS
Body Mass Index: Care Instructions  Your Care Instructions    Body mass index (BMI) can help you see if your weight is raising your risk for health problems. It uses a formula to compare how much you weigh with how tall you are. · A BMI lower than 18.5 is considered underweight. · A BMI between 18.5 and 24.9 is considered healthy. · A BMI between 25 and 29.9 is considered overweight. A BMI of 30 or higher is considered obese. If your BMI is in the normal range, it means that you have a lower risk for weight-related health problems. If your BMI is in the overweight or obese range, you may be at increased risk for weight-related health problems, such as high blood pressure, heart disease, stroke, arthritis or joint pain, and diabetes. If your BMI is in the underweight range, you may be at increased risk for health problems such as fatigue, lower protection (immunity) against illness, muscle loss, bone loss, hair loss, and hormone problems. BMI is just one measure of your risk for weight-related health problems. You may be at higher risk for health problems if you are not active, you eat an unhealthy diet, or you drink too much alcohol or use tobacco products. Follow-up care is a key part of your treatment and safety. Be sure to make and go to all appointments, and call your doctor if you are having problems. It's also a good idea to know your test results and keep a list of the medicines you take. How can you care for yourself at home? · Practice healthy eating habits. This includes eating plenty of fruits, vegetables, whole grains, lean protein, and low-fat dairy. · If your doctor recommends it, get more exercise. Walking is a good choice. Bit by bit, increase the amount you walk every day. Try for at least 30 minutes on most days of the week. · Do not smoke. Smoking can increase your risk for health problems. If you need help quitting, talk to your doctor about stop-smoking programs and medicines. These can increase your chances of quitting for good. · Limit alcohol to 2 drinks a day for men and 1 drink a day for women. Too much alcohol can cause health problems. If you have a BMI higher than 25  · Your doctor may do other tests to check your risk for weight-related health problems. This may include measuring the distance around your waist. A waist measurement of more than 40 inches in men or 35 inches in women can increase the risk of weight-related health problems. · Talk with your doctor about steps you can take to stay healthy or improve your health. You may need to make lifestyle changes to lose weight and stay healthy, such as changing your diet and getting regular exercise. If you have a BMI lower than 18.5  · Your doctor may do other tests to check your risk for health problems. · Talk with your doctor about steps you can take to stay healthy or improve your health. You may need to make lifestyle changes to gain or maintain weight and stay healthy, such as getting more healthy foods in your diet and doing exercises to build muscle. Where can you learn more? Go to http://jamie-boaz.info/. Enter S176 in the search box to learn more about \"Body Mass Index: Care Instructions. \"  Current as of: October 13, 2016  Content Version: 11.4  © 4990-2951 [employer]. Care instructions adapted under license by [employer] (which disclaims liability or warranty for this information). If you have questions about a medical condition or this instruction, always ask your healthcare professional. [address] any warranty or liability for your use of this information.

## 2019-09-05 NOTE — PROGRESS NOTES
Patient presented to office for influenza Quadrivalent 0.5 ml injection. Allergies noted. Patient well and consenting to injection. Injection given intramuscular in left deltoid. Patient tolerated injection well and left office ambulatory.

## 2019-09-05 NOTE — PROGRESS NOTES
Po Macias presents today for No chief complaint on file. Is someone accompanying this pt? no    Is the patient using any DME equipment during [address]? no    Depression Screening:  3 most recent PHQ Screens 9/5/2019   Little interest or pleasure in doing things Not at all   Feeling down, depressed, irritable, or hopeless Not at all   Total Score PHQ 2 0       Learning Assessment:  Learning Assessment 6/19/2018   PRIMARY LEARNER Patient   CO-LEARNER CAREGIVER -   PRIMARY LANGUAGE ENGLISH   LEARNER PREFERENCE PRIMARY DEMONSTRATION     READING   ANSWERED BY patient   RELATIONSHIP SELF       Abuse Screening:  Abuse Screening Questionnaire 7/26/2019   Do you ever feel afraid of your partner? N   Are you in a relationship with someone who physically or mentally threatens you? N   Is it safe for you to go home? Y       Fall Risk  Fall Risk Assessment, last 12 mths 7/26/2019   Able to walk? Yes   Fall in past 12 months? No       Health Maintenance reviewed and discussed and ordered per Provider. Health Maintenance Due   Topic Date Due    Shingrix Vaccine Age 50> (1 of 2) 04/17/2017    FOBT Q 1 YEAR AGE 50-75  04/17/2017    BREAST CANCER SCRN MAMMOGRAM  05/01/2017    PAP AKA CERVICAL CYTOLOGY  10/20/2018    Influenza Age 5 to Adult  08/01/2019           Coordination of Care:  1. Have you been to the ER, urgent care clinic since your last visit? Hospitalized since your last visit? no    2. Have you seen or consulted any other health care providers outside of the [address] [place] [address] since your last visit? Include any pap smears or colon screening.  no

## 2019-09-26 DIAGNOSIS — B96.89 BACTERIAL VAGINOSIS: Primary | ICD-10-CM

## 2019-09-26 DIAGNOSIS — N76.0 BACTERIAL VAGINOSIS: Primary | ICD-10-CM

## 2019-09-26 RX ORDER — METRONIDAZOLE 500 MG/1
500 TABLET ORAL 2 TIMES DAILY
Qty: 14 TAB | Refills: 0 | Status: SHIPPED | OUTPATIENT
Start: 2019-09-26 | End: 2019-10-03

## 2019-09-27 ENCOUNTER — TELEPHONE (OUTPATIENT)
Dept: FAMILY MEDICINE CLINIC | Facility: CLINIC | Age: 54
End: 2019-09-27

## 2019-09-27 NOTE — PROGRESS NOTES
Please notify patient that the Pap was normal.  The lab did see some bacterial vaginosis from the Pap. Flagyl prescription called to her pharmacy.

## 2019-10-18 ENCOUNTER — HOSPITAL ENCOUNTER (OUTPATIENT)
Dept: MAMMOGRAPHY | Age: 54
Discharge: HOME | End: 2019-10-18
Attending: NURSE PRACTITIONER
Payer: COMMERCIAL

## 2019-10-18 ENCOUNTER — HOSPITAL ENCOUNTER (OUTPATIENT)
Dept: BONE DENSITY | Age: 54
Discharge: HOME | End: 2019-10-18
Attending: NURSE PRACTITIONER
Payer: COMMERCIAL

## 2019-10-18 DIAGNOSIS — Z12.39 BREAST CANCER SCREENING: ICD-10-CM

## 2019-10-18 DIAGNOSIS — Z78.0 MENOPAUSE: ICD-10-CM

## 2019-10-18 PROCEDURE — 77080 DXA BONE DENSITY AXIAL: CPT

## 2019-10-18 PROCEDURE — 77067 SCR MAMMO BI INCL CAD: CPT

## 2019-10-22 NOTE — PROGRESS NOTES
Please let patient know the xray results showed osteopenia.   Please make sure patient is taking calcium plus vitamin D every day

## 2019-11-25 DIAGNOSIS — R60.0 PEDAL EDEMA: ICD-10-CM

## 2019-11-26 RX ORDER — FUROSEMIDE 40 MG/1
TABLET ORAL
Qty: 60 TAB | Refills: 0 | Status: SHIPPED | OUTPATIENT
Start: 2019-11-26 | End: 2020-01-22

## 2020-01-13 DIAGNOSIS — E78.2 MIXED HYPERLIPIDEMIA: ICD-10-CM

## 2020-01-14 RX ORDER — ATORVASTATIN CALCIUM 20 MG/1
TABLET, FILM COATED ORAL
Qty: 30 TAB | Refills: 2 | Status: SHIPPED | OUTPATIENT
Start: 2020-01-14 | End: 2020-04-15

## 2020-01-22 DIAGNOSIS — R60.0 PEDAL EDEMA: ICD-10-CM

## 2020-01-22 RX ORDER — FUROSEMIDE 40 MG/1
TABLET ORAL
Qty: 60 TAB | Refills: 0 | Status: SHIPPED | OUTPATIENT
Start: 2020-01-22 | End: 2020-03-23

## 2020-03-05 ENCOUNTER — OFFICE VISIT (OUTPATIENT)
Dept: FAMILY MEDICINE CLINIC | Facility: CLINIC | Age: 55
End: 2020-03-05

## 2020-03-05 VITALS
DIASTOLIC BLOOD PRESSURE: 76 MMHG | TEMPERATURE: 97 F | WEIGHT: 215 LBS | RESPIRATION RATE: 12 BRPM | BODY MASS INDEX: 36.7 KG/M2 | SYSTOLIC BLOOD PRESSURE: 135 MMHG | OXYGEN SATURATION: 98 % | HEART RATE: 73 BPM | HEIGHT: 64 IN

## 2020-03-05 DIAGNOSIS — R79.89 ELEVATED TSH: ICD-10-CM

## 2020-03-05 DIAGNOSIS — K21.9 GASTROESOPHAGEAL REFLUX DISEASE WITHOUT ESOPHAGITIS: ICD-10-CM

## 2020-03-05 DIAGNOSIS — E78.2 MIXED HYPERLIPIDEMIA: ICD-10-CM

## 2020-03-05 DIAGNOSIS — E66.09 CLASS 2 OBESITY DUE TO EXCESS CALORIES WITHOUT SERIOUS COMORBIDITY WITH BODY MASS INDEX (BMI) OF 37.0 TO 37.9 IN ADULT: Primary | ICD-10-CM

## 2020-03-05 DIAGNOSIS — I10 ESSENTIAL HYPERTENSION: ICD-10-CM

## 2020-03-05 NOTE — PROGRESS NOTES
Visit Vitals  /76   Pulse 73   Temp 97 °F (36.1 °C) (Oral)   Resp 12   Ht 5' 4\" (1.626 m)   Wt 215 lb (97.5 kg)   SpO2 98%   BMI 36.90 kg/m²     Henrietta Michelle presents today for   Chief Complaint   Patient presents with    Cholesterol Problem     follow-up    Cold Symptoms       Is someone accompanying this pt? no    Is the patient using any DME equipment during [facility]? no    Depression Screening:  3 most recent PHQ Screens 3/5/2020   Little interest or pleasure in doing things Not at all   Feeling down, depressed, irritable, or hopeless Not at all   Total Score PHQ 2 0       Learning Assessment:  Learning Assessment 6/19/2018   PRIMARY LEARNER Patient   CO-LEARNER CAREGIVER -   PRIMARY LANGUAGE ENGLISH   LEARNER PREFERENCE PRIMARY DEMONSTRATION     READING   ANSWERED BY patient   RELATIONSHIP SELF       Abuse Screening:  Abuse Screening Questionnaire 7/26/2019   Do you ever feel afraid of your partner? N   Are you in a relationship with someone who physically or mentally threatens you? N   Is it safe for you to go home? Y       Fall Risk  Fall Risk Assessment, last 12 mths 7/26/2019   Able to walk? Yes   Fall in past 12 months? No       Health Maintenance reviewed and discussed and ordered per Provider. Health Maintenance Due   Topic Date Due    Shingrix Vaccine Age 49> (1 of 2) 04/17/2017           Coordination of Care:  1. Have you been to the ER, urgent care clinic since your last visit? Hospitalized since your last visit? no    2. Have you seen or consulted any other health care providers outside of the [address] [place] [address] since your last visit? Include any pap smears or colon screening.  no

## 2020-03-05 NOTE — PROGRESS NOTES
Zoie Torres is a 48 y.o. female presenting today for Cholesterol Problem (follow-up) and Cold Symptoms  . HPI:  Zoie Torres presents to the office today for routine follow-up care. Patient has a past medical history for hypertension, mixed hyperlipidemia, GERD and obesity. Presents today noting she feels well. She does have some cold-like symptoms but noted that her started approximately 2 days ago with nasal congestion and mild nonproductive cough. Hypertension-patient is diet-controlled hypertension. Her blood pressure today is controlled 76 and a repeat pressure of 124/72. She denies any chest pain or palpitation. She is positive for lower extremity lymphedema but is not interested in going to the lymphedema clinic. Review of Systems   Constitutional: Negative for malaise/fatigue. HENT: Positive for congestion. Respiratory: Positive for cough. Negative for sputum production. Cardiovascular: Negative for chest pain and palpitations. Gastrointestinal: Negative for abdominal pain, constipation, nausea and vomiting. Genitourinary: Negative for dysuria, frequency and urgency. Musculoskeletal: Negative for back pain and myalgias. Neurological: Negative for dizziness and headaches. No Known Allergies    Current Outpatient Medications   Medication Sig Dispense Refill    furosemide (LASIX) 40 mg tablet TAKE ONE TABLET BY MOUTH DAILY 60 Tab 0    atorvastatin (LIPITOR) 20 mg tablet TAKE ONE TABLET BY MOUTH DAILY 30 Tab 2    omeprazole (PRILOSEC) 20 mg capsule TAKE 1 CAPSULE BY MOUTH DAILY 60 Cap 0    naproxen sodium (ALEVE) 220 mg tablet Take 220 mg by mouth as needed.  MV-Min-Folic Acid-Lutein (CENTRUM SILVER) 500-250 mcg chew Take  by mouth.       buPROPion SR (WELLBUTRIN SR) 150 mg SR tablet Take 1 tablet by mouth x 3 days then take 1 tablet by mouth twice richardy 61 Tab 1       Past Medical History:   Diagnosis Date    GERD (gastroesophageal reflux disease)     Musculoskeletal disorder        Past Surgical History:   Procedure Laterality Date    HX CERVICAL FUSION      plated for fracture    HX KNEE ARTHROSCOPY Right medial meniscus tear    HX ORTHOPAEDIC Right ACL resection       Social History     Socioeconomic History    Marital status: SINGLE     Spouse name: Not on file    Number of children: Not on file    Years of education: Not on file    Highest education level: Not on file   Occupational History    Not on file   Social Needs    Financial resource strain: Not on file    Food insecurity:     Worry: Not on file     Inability: Not on file    Transportation needs:     Medical: Not on file     Non-medical: Not on file   Tobacco Use    Smoking status: Former Smoker     Last attempt to quit: 2018     Years since quittin.0    Smokeless tobacco: Never Used   Substance and Sexual Activity    Alcohol use: Yes    Drug use: No    Sexual activity: Not Currently   Lifestyle    Physical activity:     Days per week: Not on file     Minutes per session: Not on file    Stress: Not on file   Relationships    Social connections:     Talks on phone: Not on file     Gets together: Not on file     Attends Sabianism service: Not on file     Active member of club or organization: Not on file     Attends meetings of clubs or organizations: Not on file     Relationship status: Not on file    Intimate partner violence:     Fear of current or ex partner: Not on file     Emotionally abused: Not on file     Physically abused: Not on file     Forced sexual activity: Not on file   Other Topics Concern    Not on file   Social History Narrative    Not on file       Patient does not have an advanced directive on file    Vitals:    20 0704   BP: 135/76   Pulse: 73   Resp: 12   Temp: 97 °F (36.1 °C)   TempSrc: Oral   SpO2: 98%   Weight: 215 lb (97.5 kg)   Height: 5' 4\" (1.626 m)   PainSc:   0 - No pain       Physical Exam  Vitals signs and nursing note reviewed. Constitutional:       Appearance: Normal appearance. Cardiovascular:      Rate and Rhythm: Normal rate and regular rhythm. Pulses: Normal pulses. Heart sounds: Normal heart sounds. Pulmonary:      Effort: Pulmonary effort is normal.      Breath sounds: Normal breath sounds. Abdominal:      General: Bowel sounds are normal. There is no distension. Palpations: Abdomen is soft. Tenderness: There is no abdominal tenderness. Musculoskeletal:      Right lower leg: Edema ( bilateral lower extremity edema) present. Left lower leg: Edema present. Neurological:      Mental Status: She is alert. No visits with results within 3 Month(s) from this visit. Latest known visit with results is:   Hospital Outpatient Visit on 08/20/2019   Component Date Value Ref Range Status    WBC 08/20/2019 6.8  4.6 - 13.2 K/uL Final    RBC 08/20/2019 4.94  4.20 - 5.30 M/uL Final    HGB 08/20/2019 15.5  12.0 - 16.0 g/dL Final    HCT 08/20/2019 48.4* 35.0 - 45.0 % Final    MCV 08/20/2019 98.0* 74.0 - 97.0 FL Final    MCH 08/20/2019 31.4  24.0 - 34.0 PG Final    MCHC 08/20/2019 32.0  31.0 - 37.0 g/dL Final    RDW 08/20/2019 14.9* 11.6 - 14.5 % Final    PLATELET 65/00/3000 902  135 - 420 K/uL Final    MPV 08/20/2019 10.5  9.2 - 11.8 FL Final    NEUTROPHILS 08/20/2019 76* 40 - 73 % Final    LYMPHOCYTES 08/20/2019 14* 21 - 52 % Final    MONOCYTES 08/20/2019 7  3 - 10 % Final    EOSINOPHILS 08/20/2019 3  0 - 5 % Final    BASOPHILS 08/20/2019 0  0 - 2 % Final    ABS. NEUTROPHILS 08/20/2019 5.2  1.8 - 8.0 K/UL Final    ABS. LYMPHOCYTES 08/20/2019 1.0  0.9 - 3.6 K/UL Final    ABS. MONOCYTES 08/20/2019 0.5  0.05 - 1.2 K/UL Final    ABS. EOSINOPHILS 08/20/2019 0.2  0.0 - 0.4 K/UL Final    ABS.  BASOPHILS 08/20/2019 0.0  0.0 - 0.1 K/UL Final    DF 08/20/2019 AUTOMATED    Final    LIPID PROFILE 08/20/2019        Final    Cholesterol, total 08/20/2019 286* <200 MG/DL Final    Triglyceride 08/20/2019 162* <150 MG/DL Final    Comment: The drugs N-acetylcysteine (NAC) and  Metamiszole have been found to cause falsely  low results in this chemical assay. Please  be sure to submit blood samples obtained  BEFORE administration of either of these  drugs to assure correct results.  HDL Cholesterol 08/20/2019 71* 40 - 60 MG/DL Final    LDL, calculated 08/20/2019 182.6* 0 - 100 MG/DL Final    VLDL, calculated 08/20/2019 32.4  MG/DL Final    CHOL/HDL Ratio 08/20/2019 4.0  0 - 5.0   Final    Sodium 08/20/2019 141  136 - 145 mmol/L Final    Potassium 08/20/2019 4.9  3.5 - 5.5 mmol/L Final    Chloride 08/20/2019 106  100 - 111 mmol/L Final    CO2 08/20/2019 31  21 - 32 mmol/L Final    Anion gap 08/20/2019 4  3.0 - 18 mmol/L Final    Glucose 08/20/2019 117* 74 - 99 mg/dL Final    BUN 08/20/2019 14  7.0 - 18 MG/DL Final    Creatinine 08/20/2019 0.81  0.6 - 1.3 MG/DL Final    BUN/Creatinine ratio 08/20/2019 17  12 - 20   Final    GFR est AA 08/20/2019 >60  >60 ml/min/1.73m2 Final    GFR est non-AA 08/20/2019 >60  >60 ml/min/1.73m2 Final    Comment: (NOTE)  Estimated GFR is calculated using the Modification of Diet in Renal   Disease (MDRD) Study equation, reported for both  Americans   (GFRAA) and non- Americans (GFRNA), and normalized to 1.73m2   body surface area. The physician must decide which value applies to   the patient. The MDRD study equation should only be used in   individuals age 25 or older. It has not been validated for the   following: pregnant women, patients with serious comorbid conditions,   or on certain medications, or persons with extremes of body size,   muscle mass, or nutritional status.  Calcium 08/20/2019 9.1  8.5 - 10.1 MG/DL Final    Bilirubin, total 08/20/2019 0.3  0.2 - 1.0 MG/DL Final    ALT (SGPT) 08/20/2019 30  13 - 56 U/L Final    AST (SGOT) 08/20/2019 15  10 - 38 U/L Final    Alk.  phosphatase 08/20/2019 92  45 - 117 U/L Final    Protein, total 08/20/2019 5.8* 6.4 - 8.2 g/dL Final    Albumin 08/20/2019 3.1* 3.4 - 5.0 g/dL Final    Globulin 08/20/2019 2.7  2.0 - 4.0 g/dL Final    A-G Ratio 08/20/2019 1.1  0.8 - 1.7   Final    TSH 08/20/2019 4.27* 0.36 - 3.74 uIU/mL Final    Vitamin D 25-Hydroxy 08/20/2019 34.7  30 - 100 ng/mL Final    Comment: (NOTE)  Deficiency               <20 ng/mL  Insufficiency          20-30 ng/mL  Sufficient             ng/mL  Possible toxicity       >100 ng/mL    The Method used is Siemens Advia Centaur currently standardized to a   Center of Disease Control and Prevention (CDC) certified reference   [address]. Samples containing fluorescein dye can produce falsely   elevated values when tested with the ADVIA Centaur Vitamin D Assay. It is recommended that results in the toxic range, >100 ng/mL, be   retested 72 hours post fluorescein exposure. .No results found for any visits on 03/05/20. Assessment / Plan:      ICD-10-CM ICD-9-CM    1. Class 2 obesity due to excess calories without serious comorbidity with body mass index (BMI) of 37.0 to 37.9 in adult E66.09 278.00 HEMOGLOBIN A1C WITH EAG    Z68.37 V85.37    2. Essential hypertension I10 401.9 CBC WITH AUTOMATED DIFF      METABOLIC PANEL, COMPREHENSIVE   3. Gastroesophageal reflux disease without esophagitis K21.9 530.81    4. Mixed hyperlipidemia E78.2 272.2 LIPID PANEL   5. Elevated TSH R79.89 794.5 TSH 3RD GENERATION     Fasting labs next visit  HTN-controlled  Mixed hyperlipidemia- fasting lipid panel next OV. Continue Lipitor 20 mg        Follow-up and Dispositions    · Return in about 4 months (around 7/5/2020). I asked the patient if she  had any questions and answered her  questions. The patient stated that she understands the treatment plan and agrees with the treatment plan    This document was created with a voice activated dictation system and may contain transcription errors.

## 2020-03-23 DIAGNOSIS — R60.0 PEDAL EDEMA: ICD-10-CM

## 2020-03-23 RX ORDER — FUROSEMIDE 40 MG/1
TABLET ORAL
Qty: 60 TAB | Refills: 0 | Status: SHIPPED | OUTPATIENT
Start: 2020-03-23 | End: 2020-05-26

## 2020-05-26 DIAGNOSIS — R60.0 PEDAL EDEMA: ICD-10-CM

## 2020-05-26 RX ORDER — FUROSEMIDE 40 MG/1
TABLET ORAL
Qty: 60 TAB | Refills: 0 | Status: SHIPPED | OUTPATIENT
Start: 2020-05-26 | End: 2020-07-30

## 2020-08-18 DIAGNOSIS — E78.2 MIXED HYPERLIPIDEMIA: ICD-10-CM

## 2020-08-19 RX ORDER — ATORVASTATIN CALCIUM 20 MG/1
TABLET, FILM COATED ORAL
Qty: 30 TAB | Refills: 2 | Status: SHIPPED | OUTPATIENT
Start: 2020-08-19 | End: 2020-11-30

## 2021-01-03 DIAGNOSIS — E78.2 MIXED HYPERLIPIDEMIA: ICD-10-CM

## 2021-01-04 RX ORDER — ATORVASTATIN CALCIUM 20 MG/1
TABLET, FILM COATED ORAL
Qty: 30 TAB | Refills: 0 | Status: SHIPPED | OUTPATIENT
Start: 2021-01-04 | End: 2021-01-14 | Stop reason: SDUPTHER

## 2021-01-14 ENCOUNTER — VIRTUAL VISIT (OUTPATIENT)
Dept: FAMILY MEDICINE CLINIC | Age: 56
End: 2021-01-14
Payer: COMMERCIAL

## 2021-01-14 DIAGNOSIS — E78.2 MIXED HYPERLIPIDEMIA: ICD-10-CM

## 2021-01-14 DIAGNOSIS — R60.0 PEDAL EDEMA: ICD-10-CM

## 2021-01-14 DIAGNOSIS — E66.09 CLASS 2 OBESITY DUE TO EXCESS CALORIES WITHOUT SERIOUS COMORBIDITY WITH BODY MASS INDEX (BMI) OF 37.0 TO 37.9 IN ADULT: ICD-10-CM

## 2021-01-14 DIAGNOSIS — I10 ESSENTIAL HYPERTENSION: Primary | ICD-10-CM

## 2021-01-14 PROCEDURE — 99213 OFFICE O/P EST LOW 20 MIN: CPT | Performed by: NURSE PRACTITIONER

## 2021-01-14 RX ORDER — ATORVASTATIN CALCIUM 20 MG/1
TABLET, FILM COATED ORAL
Qty: 90 TAB | Refills: 1 | Status: SHIPPED | OUTPATIENT
Start: 2021-01-14 | End: 2021-08-06

## 2021-01-14 RX ORDER — FUROSEMIDE 40 MG/1
TABLET ORAL
Qty: 90 TAB | Refills: 1 | Status: SHIPPED | OUTPATIENT
Start: 2021-01-14 | End: 2021-07-26

## 2021-01-14 NOTE — PROGRESS NOTES
Kristen Mohan is a 50 y.o. female who was seen by synchronous (real-time) audio-video technology on 1/14/2021 for Hypertension (follow-up) and Medication Refill    Assessment & Plan:   Diagnoses and all orders for this visit:    1. Essential hypertension  -     CBC WITH AUTOMATED DIFF; Future  -     METABOLIC PANEL, COMPREHENSIVE; Future  -     HEMOGLOBIN A1C WITH EAG; Future    2. Pedal edema  -     furosemide (LASIX) 40 mg tablet; TAKE ONE TABLET BY MOUTH DAILY    3. Mixed hyperlipidemia  -     atorvastatin (LIPITOR) 20 mg tablet; TAKE ONE TABLET BY MOUTH DAILY  -     LIPID PANEL; Future    4. Class 2 obesity due to excess calories without serious comorbidity with body mass index (BMI) of 37.0 to 37.9 in adult  -     TSH 3RD GENERATION; Future            Subjective: The patient presents for an Audio-visual teleconference appointment for hypertension and hyperlipidemia follow-up care. Patient was last seen in the practice in March, 2020. She carries a medical diagnosis for hypertension, hyperlipidemia, GERD and obesity. Hypertension-  She has tried to maintain her blood pressure with diet. Her blood pressure today is 140/80. She denies any chest tightness, pain or palpitation. She does have a history of chronic lower extremity lymphedema but has not been interested in going to the lymphedema clinic. She is prescribed furosemide daily. Hyperlipidemia-her last lipid panel was in August, 2019  And was abnormal.  She is prescribed atorvastatin 20 mg daily. Fasting labs was ordered in March, 2020 but secondary to the pandemic they were not completed. She is negative for 1 myalgia. Prior to Admission medications    Medication Sig Start Date End Date Taking?  Authorizing Provider   furosemide (LASIX) 40 mg tablet TAKE ONE TABLET BY MOUTH DAILY 1/14/21  Yes Suresh Lomax NP   atorvastatin (LIPITOR) 20 mg tablet TAKE ONE TABLET BY MOUTH DAILY 1/14/21  Yes Suresh Lomax NP   omeprazole (PRILOSEC) 20 mg capsule TAKE 1 CAPSULE BY MOUTH DAILY 9/5/19  Yes Shashank Dillon NP   naproxen sodium (ALEVE) 220 mg tablet Take 220 mg by mouth as needed. Yes Provider, Historical   MV-Min-Folic Acid-Lutein (CENTRUM SILVER) 500-250 mcg chew Take  by mouth. Yes Provider, Historical   atorvastatin (LIPITOR) 20 mg tablet TAKE ONE TABLET BY MOUTH DAILY 1/4/21 1/14/21  Shashank Dillon NP   furosemide (LASIX) 40 mg tablet TAKE ONE TABLET BY MOUTH DAILY 7/30/20 1/14/21  Shashank Dillon NP   buPROPion SR [facility] SR) 150 mg SR tablet Take 1 tablet by mouth x 3 days then take 1 tablet by mouth twice dailly 7/26/19   Shashank Dillon NP     Patient Active Problem List   Diagnosis Code    GERD (gastroesophageal reflux disease) K21.9    Class 2 obesity due to excess calories without serious comorbidity with body mass index (BMI) of 37.0 to 37.9 in adult E66.09, Z68.37    Lymphedema of both lower extremities I89.0    Essential hypertension I10    Severe obesity (HCC) E66.01     Patient Active Problem List    Diagnosis Date Noted    Severe obesity ([facility] [address] 07/26/2019    Lymphedema of both lower extremities 06/19/2018    Essential hypertension 06/19/2018    Class 2 obesity due to excess calories without serious comorbidity with body mass index (BMI) of 37.0 to 37.9 in adult 12/12/2017    GERD (gastroesophageal reflux disease) 03/24/2015     Current Outpatient Medications   Medication Sig Dispense Refill    furosemide (LASIX) 40 mg tablet TAKE ONE TABLET BY MOUTH DAILY 90 Tab 1    atorvastatin (LIPITOR) 20 mg tablet TAKE ONE TABLET BY MOUTH DAILY 90 Tab 1    omeprazole (PRILOSEC) 20 mg capsule TAKE 1 CAPSULE BY MOUTH DAILY 60 Cap 0    naproxen sodium (ALEVE) 220 mg tablet Take 220 mg by mouth as needed.  MV-Min-Folic Acid-Lutein (CENTRUM SILVER) 500-250 mcg chew Take  by mouth.       buPROPion SR (WELLBUTRIN SR) 150 mg SR tablet Take 1 tablet by mouth x 3 days then take 1 tablet by mouth twice chayllsandra 61 Tab 1     No Known Allergies  Past Medical History:   Diagnosis Date    GERD (gastroesophageal reflux disease)     Musculoskeletal disorder        ROS    ROS:  History obtained from the patient intake forms which are reviewed with the patient  · General: negative for - chills, fever, weight changes or malaise  · HEENT: no sore throat, nasal congestion, vision problems or ear problems  · Respiratory: no cough, shortness of breath, or wheezing  · Cardiovascular: no chest pain, palpitations, or dyspnea on exertion  · Gastrointestinal: no abdominal pain, N/V, change in bowel habits, or black or bloody stools  · Musculoskeletal: no back pain, joint pain, joint stiffness, muscle pain or muscle weakness  · Neurological: no numbness, tingling, headache or dizziness  · Endo:  No polyuria or polydipsia. · : no hematuria, dysuria, frequency, hesitancy, or nocturia.     · Psychological: negative for - anxiety, depression, sleep disturbances, suicidal or homicidal ideations    Objective:     Patient-Reported Vitals 1/14/2021   Patient-Reported Pulse 88   Patient-Reported Systolic  899   Patient-Reported Diastolic 80        [INSTRUCTIONS:  \"[x]\" Indicates a positive item  \"[]\" Indicates a negative item  -- DELETE ALL ITEMS NOT EXAMINED]    Constitutional: [x] Appears well-developed and well-nourished [x] No apparent distress      [] Abnormal -     Mental status: [x] Alert and awake  [x] Oriented to person/place/time [x] Able to follow commands    [] Abnormal -     Eyes:   EOM    [x]  Normal    [] Abnormal -   Sclera  [x]  Normal    [] Abnormal -          Discharge [x]  None visible   [] Abnormal -     HENT: [x] Normocephalic, atraumatic  [] Abnormal -   [x] Mouth/Throat: Mucous membranes are moist    External Ears [x] Normal  [] Abnormal -    Neck: [x] No visualized mass [] Abnormal -     Pulmonary/Chest: [x] Respiratory effort normal   [x] No visualized signs of difficulty breathing or respiratory distress        [] Abnormal -      Musculoskeletal:   [x] Normal gait with no signs of ataxia         [x] Normal range of motion of neck        [] Abnormal -     Neurological:        [x] No Facial Asymmetry (Cranial nerve 7 motor function) (limited exam due to video visit)          [x] No gaze palsy        [] Abnormal -          Skin:        [x] No significant exanthematous lesions or discoloration noted on facial skin         [] Abnormal -            Psychiatric:       [x] Normal Affect [] Abnormal -        [x] No Hallucinations    Other pertinent observable physical exam findings:-        We discussed the expected course, resolution and complications of the diagnosis(es) in detail. Medication risks, benefits, costs, interactions, and alternatives were discussed as indicated. I advised her to contact the office if her condition worsens, changes or fails to improve as anticipated. She expressed understanding with the diagnosis(es) and plan. Hui Varghese, who was evaluated through a patient-initiated, synchronous (real-time) audio-video encounter, and/or her healthcare decision maker, is aware that it is a billable service, with coverage as determined by her insurance carrier. She provided verbal consent to proceed: Yes, and patient identification was verified. It was conducted pursuant to the emergency declaration under the [facility], [address] [place] [address] authority and the [employer] General Act. A caregiver was present when appropriate. Ability to conduct physical exam was limited. I was at home. The patient was at home.       Merlin Posner, NP

## 2021-01-14 NOTE — PROGRESS NOTES
Mercedes Park presents today for   Chief Complaint   Patient presents with    Hypertension     follow-up    Medication Refill       Virtual/telephone visit    Depression Screening:  3 most recent PHQ Screens 1/14/2021   Little interest or pleasure in doing things Not at all   Feeling down, depressed, irritable, or hopeless Not at all   Total Score PHQ 2 0       Learning Assessment:  Learning Assessment 6/19/2018   PRIMARY LEARNER Patient   CO-LEARNER CAREGIVER -   PRIMARY LANGUAGE ENGLISH   LEARNER PREFERENCE PRIMARY DEMONSTRATION     READING   ANSWERED BY patient   RELATIONSHIP SELF       Health Maintenance reviewed and discussed and ordered per Provider. Health Maintenance Due   Topic Date Due    Shingrix Vaccine Age 49> (1 of 2) 04/17/2017    Lipid Screen  08/20/2020    Flu Vaccine (1) 09/01/2020   . Coordination of Care:  1. Have you been to the ER, urgent care clinic since your last visit? Hospitalized since your last visit? no    2. Have you seen or consulted any other health care providers outside of the [address] [place] [address] since your last visit? Include any pap smears or colon screening.  no

## 2021-07-30 DIAGNOSIS — R60.0 PEDAL EDEMA: ICD-10-CM

## 2021-08-02 RX ORDER — FUROSEMIDE 40 MG/1
TABLET ORAL
Qty: 90 TABLET | Refills: 0 | Status: SHIPPED | OUTPATIENT
Start: 2021-08-02 | End: 2021-10-30

## 2021-08-04 DIAGNOSIS — E78.2 MIXED HYPERLIPIDEMIA: ICD-10-CM

## 2021-08-06 RX ORDER — ATORVASTATIN CALCIUM 20 MG/1
TABLET, FILM COATED ORAL
Qty: 90 TABLET | Refills: 0 | Status: SHIPPED | OUTPATIENT
Start: 2021-08-06 | End: 2021-11-08

## 2021-08-31 ENCOUNTER — OFFICE VISIT (OUTPATIENT)
Dept: FAMILY MEDICINE CLINIC | Age: 56
End: 2021-08-31
Payer: COMMERCIAL

## 2021-08-31 VITALS
BODY MASS INDEX: 34.49 KG/M2 | OXYGEN SATURATION: 97 % | HEIGHT: 64 IN | SYSTOLIC BLOOD PRESSURE: 135 MMHG | HEART RATE: 82 BPM | DIASTOLIC BLOOD PRESSURE: 85 MMHG | WEIGHT: 202 LBS | TEMPERATURE: 97 F | RESPIRATION RATE: 16 BRPM

## 2021-08-31 DIAGNOSIS — Z11.59 ENCOUNTER FOR HEPATITIS C SCREENING TEST FOR LOW RISK PATIENT: ICD-10-CM

## 2021-08-31 DIAGNOSIS — Z12.31 ENCOUNTER FOR SCREENING MAMMOGRAM FOR MALIGNANT NEOPLASM OF BREAST: ICD-10-CM

## 2021-08-31 DIAGNOSIS — E78.2 MIXED HYPERLIPIDEMIA: ICD-10-CM

## 2021-08-31 DIAGNOSIS — R73.03 PRE-DIABETES: Primary | ICD-10-CM

## 2021-08-31 DIAGNOSIS — I10 ESSENTIAL HYPERTENSION: ICD-10-CM

## 2021-08-31 LAB — HBA1C MFR BLD HPLC: 6.1 %

## 2021-08-31 PROCEDURE — 99214 OFFICE O/P EST MOD 30 MIN: CPT | Performed by: NURSE PRACTITIONER

## 2021-08-31 PROCEDURE — 83036 HEMOGLOBIN GLYCOSYLATED A1C: CPT | Performed by: NURSE PRACTITIONER

## 2021-08-31 NOTE — PROGRESS NOTES
Miguel Ángel Pederson is a 49 y.o. female presenting today for Hypertension (follow-up)  . Chief Complaint   Patient presents with    Hypertension     follow-up       HPI:  Miguel Ángel Pederson presents to the office today for routine follow-up care. She carries medical diagnosis for diet-controlled hypertension, hyperlipidemia and prediabetes. Hyperlipidemia-prescribed atorvastatin daily no recent lipid panel in the medical record. Last lipid panel completed in 2019 which was elevated. Her LDL was 182 and her cholesterol was 286. Hypertension-/85. Negative for chest pain or palpitation. She does carry a medical diagnosis for lymphedema bilateral lower extremities. Prediabetes-negative for any polyuria polydipsia. Hemoglobin A1c today is 6.1. ROS    ROS:  History obtained from the patient intake forms which are reviewed with the patient  · General: negative for - chills, fever, weight changes or malaise  · HEENT: no sore throat, nasal congestion, vision problems or ear problems  · Respiratory: no cough, shortness of breath, or wheezing  · Cardiovascular: no chest pain, palpitations, or dyspnea on exertion  · Gastrointestinal: no abdominal pain, N/V, change in bowel habits, or black or bloody stools  · Musculoskeletal: no back pain, joint pain, joint stiffness, muscle pain or muscle weakness  · Neurological: no numbness, tingling, headache or dizziness  · Endo:  No polyuria or polydipsia. · : no hematuria, dysuria, frequency, hesitancy, or nocturia.     · Psychological: negative for - anxiety, depression, sleep disturbances, suicidal or homicidal ideations    No Known Allergies    PHQ Screening   3 most recent PHQ Screens 8/31/2021   Little interest or pleasure in doing things Not at all   Feeling down, depressed, irritable, or hopeless Not at all   Total Score PHQ 2 0       History  Past Medical History:   Diagnosis Date    GERD (gastroesophageal reflux disease)     Musculoskeletal disorder Past Surgical History:   Procedure Laterality Date    HX CERVICAL FUSION      plated for fracture    HX KNEE ARTHROSCOPY Right medial meniscus tear    HX ORTHOPAEDIC Right ACL resection       Social History     Socioeconomic History    Marital status: SINGLE     Spouse name: Not on file    Number of children: Not on file    Years of education: Not on file    Highest education level: Not on file   Occupational History    Not on file   Tobacco Use    Smoking status: Former Smoker     Quit date: 2/5/2018     Years since quitting: 3.5    Smokeless tobacco: Never Used   Substance and Sexual Activity    Alcohol use: Yes    Drug use: No    Sexual activity: Not Currently   Other Topics Concern    Not on file   Social History Narrative    Not on file     Social Determinants of Health     Financial Resource Strain:     Difficulty of Paying Living Expenses:    Food Insecurity:     Worried About Running Out of Food in the Last Year:     [address] in the Last Year:    Transportation Needs:     Lack of Transportation (Medical):      Lack of Transportation (Non-Medical):    Physical Activity:     Days of Exercise per Week:     Minutes of Exercise per Session:    Stress:     Feeling of Stress :    Social Connections:     Frequency of Communication with Friends and Family:     Frequency of Social Gatherings with Friends and Family:     Attends Hoah[employer] Services:     Active Member of Clubs or Organizations:     Attends Club or Organization Meetings:     Marital Status:    Intimate Partner Violence:     Fear of Current or Ex-Partner:     Emotionally Abused:     Physically Abused:     Sexually Abused:        Current Outpatient Medications   Medication Sig Dispense Refill    atorvastatin (LIPITOR) 20 mg tablet TAKE ONE TABLET BY MOUTH DAILY 90 Tablet 0    furosemide (LASIX) 40 mg tablet TAKE ONE TABLET BY MOUTH DAILY 90 Tablet 0    omeprazole (PRILOSEC) 20 mg capsule TAKE 1 CAPSULE BY MOUTH DAILY 60 Cap 0    naproxen sodium (ALEVE) 220 mg tablet Take 220 mg by mouth as needed.  MV-Min-Folic Acid-Lutein (CENTRUM SILVER) 500-250 mcg chew Take  by mouth. Vitals:    08/31/21 0824   BP: 135/85   Pulse: 82   Resp: 16   Temp: 97 °F (36.1 °C)   TempSrc: Oral   SpO2: 97%   Weight: 202 lb (91.6 kg)   Height: 5' 4\" (1.626 m)   PainSc:   0 - No pain       Physical Exam  Vitals and nursing note reviewed. Constitutional:       Appearance: Normal appearance. HENT:      Head: Normocephalic. Cardiovascular:      Rate and Rhythm: Normal rate and regular rhythm. Pulses: Normal pulses. Heart sounds: Normal heart sounds. Pulmonary:      Effort: Pulmonary effort is normal.      Breath sounds: Normal breath sounds. Abdominal:      General: Bowel sounds are normal.      Palpations: Abdomen is soft. Skin:     General: Skin is warm and dry. Neurological:      General: No focal deficit present. Mental Status: She is alert. Office Visit on 08/31/2021   Component Date Value Ref Range Status    Hemoglobin A1c (POC) 08/31/2021 6.1  % Final       Results for orders placed or performed in visit on 08/31/21   AMB POC HEMOGLOBIN A1C   Result Value Ref Range    Hemoglobin A1c (POC) 6.1 %       Patient Care Team:  Patient Care Team:  Sally Jensen NP as PCP - General (Nurse Practitioner)  Sally Jensen NP as PCP - [facility]  JERICHO Ma (Physician Assistant)      Assessment / Plan:      ICD-10-CM ICD-9-CM    1. Pre-diabetes  R73.03 790.29 AMB POC HEMOGLOBIN A1C   2. Encounter for hepatitis C screening test for low risk patient  Z11.59 V73.89 HEPATITIS C AB   3. Essential hypertension  I10 401.9 LIPID PANEL      METABOLIC PANEL, COMPREHENSIVE      CBC WITH AUTOMATED DIFF   4. Encounter for screening mammogram for malignant neoplasm of breast  Z12.31 V76.12 MAI MAMMO BI SCREENING INCL CAD   5.  Mixed hyperlipidemia  E78.2 272.2 LIPID PANEL HTN- diet controlled. No change to treatment plan  Prediabetes- Controlled. Continue current treatment plan with diet and exercise  HLD- fasting labs ordered  Mammo ordered  F/u in 4 months    Follow-up and Dispositions    · Return in about 4 months (around 12/31/2021), or if symptoms worsen or fail to improve. I asked the patient if she  had any questions and answered her  questions. The patient stated that she understands the treatment plan and agrees with the treatment plan    This document was created with a voice activated dictation system and may contain transcription errors.

## 2021-08-31 NOTE — PROGRESS NOTES
Stephen Limb presents today for   Chief Complaint   Patient presents with    Hypertension     follow-up       Is someone accompanying this pt? no    Is the patient using any DME equipment during OV? no    Depression Screening:  3 most recent PHQ Screens 8/31/2021   Little interest or pleasure in doing things Not at all   Feeling down, depressed, irritable, or hopeless Not at all   Total Score PHQ 2 0       Learning Assessment:  Learning Assessment 6/19/2018   PRIMARY LEARNER Patient   CO-LEARNER CAREGIVER -   PRIMARY LANGUAGE ENGLISH   LEARNER PREFERENCE PRIMARY DEMONSTRATION     READING   ANSWERED BY patient   RELATIONSHIP SELF       Health Maintenance reviewed and discussed and ordered per Provider. Health Maintenance Due   Topic Date Due    Hepatitis C Screening  Never done    COVID-19 Vaccine (1) Never done    Shingrix Vaccine Age 50> (1 of 2) Never done    Lipid Screen  08/20/2020   . Coordination of Care:  1. Have you been to the ER, urgent care clinic since your last visit? Hospitalized since your last visit? no    2. Have you seen or consulted any other health care providers outside of the [address] [place] [address] since your last visit? Include any pap smears or colon screening.  no

## 2021-10-14 ENCOUNTER — HOSPITAL ENCOUNTER (OUTPATIENT)
Dept: LAB | Age: 56
Discharge: HOME | End: 2021-10-14

## 2021-10-14 ENCOUNTER — APPOINTMENT (OUTPATIENT)
Dept: LAB | Age: 56
End: 2021-10-14

## 2021-10-14 LAB — XX-LABCORP SPECIMEN COL,LCBCF: NORMAL

## 2021-10-14 PROCEDURE — 99001 SPECIMEN HANDLING PT-LAB: CPT

## 2021-10-15 LAB
ALBUMIN SERPL-MCNC: 3.8 G/DL (ref 3.8–4.9)
ALBUMIN/GLOB SERPL: 2.2 {RATIO} (ref 1.2–2.2)
ALP SERPL-CCNC: 97 IU/L (ref 44–121)
ALT SERPL-CCNC: 26 IU/L (ref 0–32)
AST SERPL-CCNC: 25 IU/L (ref 0–40)
BASOPHILS # BLD AUTO: 0 X10E3/UL (ref 0–0.2)
BASOPHILS NFR BLD AUTO: 0 %
BILIRUB SERPL-MCNC: 0.3 MG/DL (ref 0–1.2)
BUN SERPL-MCNC: 9 MG/DL (ref 6–24)
BUN/CREAT SERPL: 14 (ref 9–23)
CALCIUM SERPL-MCNC: 8.9 MG/DL (ref 8.7–10.2)
CHLORIDE SERPL-SCNC: 102 MMOL/L (ref 96–106)
CHOLEST SERPL-MCNC: 204 MG/DL (ref 100–199)
CO2 SERPL-SCNC: 27 MMOL/L (ref 20–29)
CREAT SERPL-MCNC: 0.65 MG/DL (ref 0.57–1)
EOSINOPHIL # BLD AUTO: 0.1 X10E3/UL (ref 0–0.4)
EOSINOPHIL NFR BLD AUTO: 1 %
ERYTHROCYTE [DISTWIDTH] IN BLOOD BY AUTOMATED COUNT: 13.8 % (ref 11.7–15.4)
GLOBULIN SER CALC-MCNC: 1.7 G/DL (ref 1.5–4.5)
GLUCOSE SERPL-MCNC: 132 MG/DL (ref 65–99)
HCT VFR BLD AUTO: 46 % (ref 34–46.6)
HCV AB S/CO SERPL IA: <0.1 S/CO RATIO (ref 0–0.9)
HDLC SERPL-MCNC: 68 MG/DL
HGB BLD-MCNC: 15.5 G/DL (ref 11.1–15.9)
IMM GRANULOCYTES # BLD AUTO: 0 X10E3/UL (ref 0–0.1)
IMM GRANULOCYTES NFR BLD AUTO: 0 %
LDLC SERPL CALC-MCNC: 117 MG/DL (ref 0–99)
LYMPHOCYTES # BLD AUTO: 1.1 X10E3/UL (ref 0.7–3.1)
LYMPHOCYTES NFR BLD AUTO: 12 %
MCH RBC QN AUTO: 31.3 PG (ref 26.6–33)
MCHC RBC AUTO-ENTMCNC: 33.7 G/DL (ref 31.5–35.7)
MCV RBC AUTO: 93 FL (ref 79–97)
MONOCYTES # BLD AUTO: 0.6 X10E3/UL (ref 0.1–0.9)
MONOCYTES NFR BLD AUTO: 7 %
NEUTROPHILS # BLD AUTO: 7.3 X10E3/UL (ref 1.4–7)
NEUTROPHILS NFR BLD AUTO: 80 %
PLATELET # BLD AUTO: 346 X10E3/UL (ref 150–450)
POTASSIUM SERPL-SCNC: 3.8 MMOL/L (ref 3.5–5.2)
PROT SERPL-MCNC: 5.5 G/DL (ref 6–8.5)
RBC # BLD AUTO: 4.96 X10E6/UL (ref 3.77–5.28)
SODIUM SERPL-SCNC: 144 MMOL/L (ref 134–144)
SPECIMEN STATUS REPORT, ROLRST: NORMAL
TRIGL SERPL-MCNC: 108 MG/DL (ref 0–149)
VLDLC SERPL CALC-MCNC: 19 MG/DL (ref 5–40)
WBC # BLD AUTO: 9.2 X10E3/UL (ref 3.4–10.8)

## 2021-10-26 NOTE — PROGRESS NOTES
Please notify patient that lab results were reviewed and the cholesterol panel is mildly elevated. Keep scheduled appointment in December.

## 2021-10-28 DIAGNOSIS — R60.0 PEDAL EDEMA: ICD-10-CM

## 2021-10-30 RX ORDER — FUROSEMIDE 40 MG/1
TABLET ORAL
Qty: 90 TABLET | Refills: 0 | Status: SHIPPED | OUTPATIENT
Start: 2021-10-30 | End: 2022-02-04

## 2021-11-04 DIAGNOSIS — E78.2 MIXED HYPERLIPIDEMIA: ICD-10-CM

## 2021-11-05 ENCOUNTER — HOSPITAL ENCOUNTER (OUTPATIENT)
Dept: MAMMOGRAPHY | Age: 56
Discharge: HOME | End: 2021-11-05
Attending: NURSE PRACTITIONER
Payer: COMMERCIAL

## 2021-11-05 DIAGNOSIS — Z12.31 ENCOUNTER FOR SCREENING MAMMOGRAM FOR MALIGNANT NEOPLASM OF BREAST: ICD-10-CM

## 2021-11-05 PROCEDURE — 77063 BREAST TOMOSYNTHESIS BI: CPT

## 2021-11-08 RX ORDER — ATORVASTATIN CALCIUM 20 MG/1
TABLET, FILM COATED ORAL
Qty: 90 TABLET | Refills: 0 | Status: SHIPPED | OUTPATIENT
Start: 2021-11-08 | End: 2022-02-14

## 2022-02-02 DIAGNOSIS — R60.0 PEDAL EDEMA: ICD-10-CM

## 2022-02-04 RX ORDER — FUROSEMIDE 40 MG/1
TABLET ORAL
Qty: 90 TABLET | Refills: 0 | Status: SHIPPED | OUTPATIENT
Start: 2022-02-04 | End: 2022-06-09 | Stop reason: SDUPTHER

## 2022-02-12 DIAGNOSIS — E78.2 MIXED HYPERLIPIDEMIA: ICD-10-CM

## 2022-02-14 RX ORDER — ATORVASTATIN CALCIUM 20 MG/1
TABLET, FILM COATED ORAL
Qty: 90 TABLET | Refills: 0 | Status: SHIPPED | OUTPATIENT
Start: 2022-02-14 | End: 2022-08-31 | Stop reason: SDUPTHER

## 2022-03-18 PROBLEM — R73.03 PREDIABETES: Status: ACTIVE | Noted: 2021-08-31

## 2022-03-18 PROBLEM — I10 ESSENTIAL HYPERTENSION: Status: ACTIVE | Noted: 2018-06-19

## 2022-03-19 PROBLEM — E66.09 CLASS 2 OBESITY DUE TO EXCESS CALORIES WITHOUT SERIOUS COMORBIDITY WITH BODY MASS INDEX (BMI) OF 37.0 TO 37.9 IN ADULT: Status: ACTIVE | Noted: 2017-12-12

## 2022-03-19 PROBLEM — E66.01 SEVERE OBESITY (HCC): Status: ACTIVE | Noted: 2019-07-26

## 2022-03-19 PROBLEM — E66.812 CLASS 2 OBESITY DUE TO EXCESS CALORIES WITHOUT SERIOUS COMORBIDITY WITH BODY MASS INDEX (BMI) OF 37.0 TO 37.9 IN ADULT: Status: ACTIVE | Noted: 2017-12-12

## 2022-03-19 PROBLEM — I89.0 LYMPHEDEMA OF BOTH LOWER EXTREMITIES: Status: ACTIVE | Noted: 2018-06-19

## 2022-06-09 ENCOUNTER — TELEPHONE (OUTPATIENT)
Dept: FAMILY MEDICINE CLINIC | Age: 57
End: 2022-06-09

## 2022-06-09 DIAGNOSIS — R60.0 PEDAL EDEMA: ICD-10-CM

## 2022-06-09 RX ORDER — FUROSEMIDE 40 MG/1
40 TABLET ORAL DAILY
Qty: 90 TABLET | Refills: 0 | Status: SHIPPED | OUTPATIENT
Start: 2022-06-09 | End: 2022-08-31 | Stop reason: SDUPTHER

## 2022-06-09 NOTE — TELEPHONE ENCOUNTER
Requested Prescriptions     Pending Prescriptions Disp Refills    furosemide (LASIX) 40 mg tablet 90 Tablet 0     Sig: Take 1 Tablet by mouth daily.
30

## 2022-08-31 ENCOUNTER — OFFICE VISIT (OUTPATIENT)
Dept: FAMILY MEDICINE CLINIC | Age: 57
End: 2022-08-31
Payer: COMMERCIAL

## 2022-08-31 VITALS
BODY MASS INDEX: 33.63 KG/M2 | RESPIRATION RATE: 16 BRPM | HEART RATE: 84 BPM | WEIGHT: 197 LBS | DIASTOLIC BLOOD PRESSURE: 88 MMHG | SYSTOLIC BLOOD PRESSURE: 138 MMHG | OXYGEN SATURATION: 97 % | HEIGHT: 64 IN

## 2022-08-31 DIAGNOSIS — M85.80 OSTEOPENIA, UNSPECIFIED LOCATION: ICD-10-CM

## 2022-08-31 DIAGNOSIS — I10 ESSENTIAL HYPERTENSION: Primary | ICD-10-CM

## 2022-08-31 DIAGNOSIS — Z23 ENCOUNTER FOR IMMUNIZATION: ICD-10-CM

## 2022-08-31 DIAGNOSIS — Z12.31 BREAST CANCER SCREENING BY MAMMOGRAM: ICD-10-CM

## 2022-08-31 DIAGNOSIS — Z00.00 ENCOUNTER FOR MEDICAL EXAMINATION TO ESTABLISH CARE: ICD-10-CM

## 2022-08-31 DIAGNOSIS — R73.03 PRE-DIABETES: ICD-10-CM

## 2022-08-31 DIAGNOSIS — E78.2 MIXED HYPERLIPIDEMIA: ICD-10-CM

## 2022-08-31 DIAGNOSIS — R60.0 PEDAL EDEMA: ICD-10-CM

## 2022-08-31 DIAGNOSIS — K21.9 GASTROESOPHAGEAL REFLUX DISEASE WITHOUT ESOPHAGITIS: ICD-10-CM

## 2022-08-31 LAB — HBA1C MFR BLD HPLC: 6 %

## 2022-08-31 PROCEDURE — 99214 OFFICE O/P EST MOD 30 MIN: CPT | Performed by: STUDENT IN AN ORGANIZED HEALTH CARE EDUCATION/TRAINING PROGRAM

## 2022-08-31 PROCEDURE — 83036 HEMOGLOBIN GLYCOSYLATED A1C: CPT | Performed by: STUDENT IN AN ORGANIZED HEALTH CARE EDUCATION/TRAINING PROGRAM

## 2022-08-31 RX ORDER — ATORVASTATIN CALCIUM 20 MG/1
20 TABLET, FILM COATED ORAL DAILY
Qty: 90 TABLET | Refills: 1 | Status: SHIPPED | OUTPATIENT
Start: 2022-08-31

## 2022-08-31 RX ORDER — OMEPRAZOLE 20 MG/1
CAPSULE, DELAYED RELEASE ORAL
Qty: 60 CAPSULE | Refills: 2 | Status: SHIPPED | OUTPATIENT
Start: 2022-08-31

## 2022-08-31 RX ORDER — ZOSTER VACCINE RECOMBINANT, ADJUVANTED 50 MCG/0.5
0.5 KIT INTRAMUSCULAR ONCE
Qty: 0.5 ML | Refills: 1 | Status: SHIPPED | OUTPATIENT
Start: 2022-08-31 | End: 2022-08-31

## 2022-08-31 RX ORDER — FUROSEMIDE 40 MG/1
40 TABLET ORAL DAILY
Qty: 90 TABLET | Refills: 1 | Status: SHIPPED | OUTPATIENT
Start: 2022-08-31

## 2022-08-31 NOTE — PATIENT INSTRUCTIONS
DASH Diet: Care Instructions  Your Care Instructions     The DASH diet is an eating plan that can help lower your blood pressure. DASH stands for Dietary Approaches to Stop Hypertension. Hypertension is high blood pressure. The DASH diet focuses on eating foods that are high in calcium, potassium, and magnesium. These nutrients can lower blood pressure. The foods that are highest in these nutrients are fruits, vegetables, low-fat dairy products, nuts, seeds, and legumes. But taking calcium, potassium, and magnesium supplements instead of eating foods that are high in those nutrients does not have the same effect. The DASH diet also includes whole grains, fish, and poultry. The DASH diet is one of several lifestyle changes your doctor may recommend to lower your high blood pressure. Your doctor may also want you to decrease the amount of sodium in your diet. Lowering sodium while following the DASH diet can lower blood pressure even further than just the DASH diet alone. Follow-up care is a key part of your treatment and safety. Be sure to make and go to all appointments, and call your doctor if you are having problems. It's also a good idea to know your test results and keep a list of the medicines you take. How can you care for yourself at home? Following the DASH diet  Eat 4 to 5 servings of fruit each day. A serving is 1 medium-sized piece of fruit, ½ cup chopped or canned fruit, 1/4 cup dried fruit, or 4 ounces (½ cup) of fruit juice. Choose fruit more often than fruit juice. Eat 4 to 5 servings of vegetables each day. A serving is 1 cup of lettuce or raw leafy vegetables, ½ cup of chopped or cooked vegetables, or 4 ounces (½ cup) of vegetable juice. Choose vegetables more often than vegetable juice. Get 2 to 3 servings of low-fat and fat-free dairy each day. A serving is 8 ounces of milk, 1 cup of yogurt, or 1 ½ ounces of cheese. Eat 6 to 8 servings of grains each day.  A serving is 1 slice of bread, 1 ounce of dry cereal, or ½ cup of cooked rice, pasta, or cooked cereal. Try to choose whole-grain products as much as possible. Limit lean meat, poultry, and fish to 2 servings each day. A serving is 3 ounces, about the size of a deck of cards. Eat 4 to 5 servings of nuts, seeds, and legumes (cooked dried beans, lentils, and split peas) each week. A serving is 1/3 cup of nuts, 2 tablespoons of seeds, or ½ cup of cooked beans or peas. Limit fats and oils to 2 to 3 servings each day. A serving is 1 teaspoon of vegetable oil or 2 tablespoons of salad dressing. Limit sweets and added sugars to 5 servings or less a week. A serving is 1 tablespoon jelly or jam, ½ cup sorbet, or 1 cup of lemonade. Eat less than 2,300 milligrams (mg) of sodium a day. If you limit your sodium to 1,500 mg a day, you can lower your blood pressure even more. Be aware that all of these are the suggested number of servings for people who eat 1,800 to 2,000 calories a day. Your recommended number of servings may be different if you need more or fewer calories. Tips for success  Start small. Do not try to make dramatic changes to your diet all at once. You might feel that you are missing out on your favorite foods and then be more likely to not follow the plan. Make small changes, and stick with them. Once those changes become habit, add a few more changes. Try some of the following:  Make it a goal to eat a fruit or vegetable at every meal and at snacks. This will make it easy to get the recommended amount of fruits and vegetables each day. Try yogurt topped with fruit and nuts for a snack or healthy dessert. Add lettuce, tomato, cucumber, and onion to sandwiches. Combine a ready-made pizza crust with low-fat mozzarella cheese and lots of vegetable toppings. Try using tomatoes, squash, spinach, broccoli, carrots, cauliflower, and onions.   Have a variety of cut-up vegetables with a low-fat dip as an appetizer instead of chips and dip.  Sprinkle sunflower seeds or chopped almonds over salads. Or try adding chopped walnuts or almonds to cooked vegetables. Try some vegetarian meals using beans and peas. Add garbanzo or kidney beans to salads. Make burritos and tacos with mashed casey beans or black beans. Where can you learn more? Go to http://www.hager.com/  Enter H967 in the search box to learn more about \"DASH Diet: Care Instructions. \"  Current as of: January 10, 2022               Content Version: 13.2  © 6528-8667 [employer]. Care instructions adapted under license by [employer] (which disclaims liability or warranty for this information). If you have questions about a medical condition or this instruction, always ask your healthcare professional. [address] any warranty or liability for your use of this information. Prediabetes: Care Instructions  Overview     Prediabetes is a warning sign that you're at risk for getting type 2 diabetes. It means that your blood sugar is higher than it should be. But it's not high enough to be diabetes. The food you eat naturally turns into sugar. Your body uses the sugar for energy. Normally, an organ called the pancreas makes insulin. And insulin allows the sugar in your blood to get into your body's cells. But sometimes the body can't use insulin the right way. So the sugar stays in your blood instead. This is called insulin resistance. The buildup of sugar in your blood means you have prediabetes. The good news is that you may be able to prevent or delay diabetes. Making small lifestyle changes, like getting active and changing your eating habits, may help you get your blood sugar back to normal. You can work with your doctor to make a treatment plan. Follow-up care is a key part of your treatment and safety. Be sure to make and go to all appointments, and call your doctor if you are having problems.  It's also a good idea to know your test results and keep a list of the medicines you take. How can you care for yourself at home? Watch your weight. A healthy weight helps your body use insulin properly. Limit the amount of calories, sweets, and unhealthy fat you eat. Ask your doctor if you should see a dietitian. A registered dietitian can help you create meal plans that fit your lifestyle. Get at least 30 minutes of exercise on most days of the week. Exercise helps control your blood sugar. It also helps you maintain a healthy weight. Walking is a good choice. You also may want to do other activities, such as running, swimming, cycling, or playing tennis or team sports. Do not smoke. Smoking can make prediabetes worse. If you need help quitting, talk to your doctor about stop-smoking programs and medicines. These can increase your chances of quitting for good. If your doctor prescribed medicines, take them exactly as prescribed. Call your doctor if you think you are having a problem with your medicine. You will get more details on the specific medicines your doctor prescribes. When should you call for help? Watch closely for changes in your health, and be sure to contact your doctor if:    You have any symptoms of diabetes. These may include:  Being thirsty more often. Urinating more. Being hungrier. Losing weight. Being very tired. Having blurry vision. You have a wound that will not heal.     You have an infection that will not go away. You have problems with your blood pressure. You want more information about diabetes and how you can keep from getting it. Where can you learn more? Go to http://www.gray.com/  Enter I222 in the search box to learn more about \"Prediabetes: Care Instructions. \"  Current as of: July 28, 2021               Content Version: 13.2  © 7488-6100 [employer].    Care instructions adapted under license by [employer] (which disclaims liability or warranty for this information). If you have questions about a medical condition or this instruction, always ask your healthcare professional. [address] any warranty or liability for your use of this information.

## 2022-08-31 NOTE — PROGRESS NOTES
Kristen Mohan is a 56 y.o. female presenting today for Establish Care  . Chief Complaint   Patient presents with    Establish Care         HPI:  Kristen Mohan presents to the office today to establish care. Patient has a past medical history of HLD, prediabetes, HTN, GERD. HTN: Patient has been working on lifestyle modification. She is currently not on any medications. BP today was initially 140/86 but repeat manual checking was 138/88. Chronic lower extremity swelling: Patient has a history of chronic lower extremity lymphedema but has previously not been interested in being referred to the lymphedema clinic. She is prescribed furosemide daily. HLD: Prescribed atorvastatin 20 mg daily but has not taken it since a few months. Denies any myalgias. Lipid panel in 8/26/2022 showed , triglycerides 288, cholesterol total 281, HDL 67.  When she was taking the Lipitor, LDL had improved to 117. Prediabetes: HbA1c was 6.1% in 8/21. GERD: Taking prilosec 20 mg OTC. Ex-smoker: Patient stopped smoking 4 years ago. Osteopenia: DEXA scan in 2019 showed low bone density. She is currently not taking any calcium or vitamin D supplements. Review of Systems   Constitutional:  Negative for chills, diaphoresis, fever, malaise/fatigue and weight loss. HENT:  Negative for congestion, ear discharge, ear pain, hearing loss, nosebleeds, sinus pain, sore throat and tinnitus. Eyes:  Negative for blurred vision, double vision and photophobia. Respiratory:  Negative for cough, sputum production, shortness of breath, wheezing and stridor. Cardiovascular:  Positive for leg swelling. Negative for chest pain, palpitations, orthopnea and claudication. Gastrointestinal:  Negative for abdominal pain, constipation, diarrhea, heartburn, nausea and vomiting. Genitourinary:  Negative for dysuria, flank pain, frequency, hematuria and urgency.    Musculoskeletal:  Negative for back pain, joint pain, myalgias and neck pain. Skin:  Negative for rash. Neurological:  Negative for tingling, tremors, sensory change, speech change, focal weakness, seizures, weakness and headaches. Psychiatric/Behavioral:  Negative for depression. The patient is not nervous/anxious. All other systems reviewed and are negative.     No Known Allergies    PHQ Screening   3 most recent PHQ Screens 2022   Little interest or pleasure in doing things Not at all   Feeling down, depressed, irritable, or hopeless Not at all   Total Score PHQ 2 0       History  Past Medical History:   Diagnosis Date    GERD (gastroesophageal reflux disease)     Musculoskeletal disorder        Past Surgical History:   Procedure Laterality Date    HX CERVICAL FUSION      plated for fracture    HX KNEE ARTHROSCOPY Right medial meniscus tear    HX ORTHOPAEDIC Right ACL resection       Social History     Socioeconomic History    Marital status: SINGLE     Spouse name: Not on file    Number of children: Not on file    Years of education: Not on file    Highest education level: Not on file   Occupational History    Not on file   Tobacco Use    Smoking status: Former     Types: Cigarettes     Quit date: 2018     Years since quittin.5    Smokeless tobacco: Never   Substance and Sexual Activity    Alcohol use: Yes    Drug use: No    Sexual activity: Not Currently   Other Topics Concern    Not on file   Social History Narrative    Not on file     Social Determinants of Health     Financial Resource Strain: Not on file   Food Insecurity: Not on file   Transportation Needs: Not on file   Physical Activity: Insufficiently Active    Days of Exercise per Week: 2 days    Minutes of Exercise per Session: 30 min   Stress: Not on file   Social Connections: Not on file   Intimate Partner Violence: Not At Risk    Fear of Current or Ex-Partner: No    Emotionally Abused: No    Physically Abused: No    Sexually Abused: No   Housing Stability: Not on file Current Outpatient Medications   Medication Sig Dispense Refill    varicella-zoster recombinant, PF, (Shingrix, PF,) 50 mcg/0.5 mL susr injection 0.5 mL by IntraMUSCular route once for 1 dose. Please administer shingles vaccine 1st dose. 2nd dose to be administered 2-6 months apart  Indications: shingles vaccination 0.5 mL 1    atorvastatin (LIPITOR) 20 mg tablet Take 1 Tablet by mouth daily. 90 Tablet 1    omeprazole (PRILOSEC) 20 mg capsule TAKE 1 CAPSULE BY MOUTH DAILY 60 Capsule 2    furosemide (LASIX) 40 mg tablet Take 1 Tablet by mouth daily. 90 Tablet 1    naproxen sodium (NAPROSYN) 220 mg tablet Take 220 mg by mouth as needed. Vitals:    08/31/22 0659 08/31/22 0726   BP: (!) 140/86 138/88   Pulse: 84    Resp: 16    SpO2: 97%    Weight: 197 lb (89.4 kg)    Height: 5' 4\" (1.626 m)    PainSc:   0 - No pain        Physical Exam  Vitals and nursing note reviewed. Constitutional:       General: She is not in acute distress. Appearance: Normal appearance. She is not ill-appearing, toxic-appearing or diaphoretic. HENT:      Head: Normocephalic and atraumatic. Eyes:      General: No scleral icterus. Extraocular Movements: Extraocular movements intact. Conjunctiva/sclera: Conjunctivae normal.      Pupils: Pupils are equal, round, and reactive to light. Cardiovascular:      Rate and Rhythm: Normal rate and regular rhythm. Pulses: Normal pulses. Heart sounds: No murmur heard. No gallop. Pulmonary:      Effort: Pulmonary effort is normal. No respiratory distress. Breath sounds: Normal breath sounds. No wheezing or rales. Musculoskeletal:         General: Normal range of motion. Cervical back: Normal range of motion. Right lower leg: Edema present. Left lower leg: Edema present. Skin:     General: Skin is warm and dry. Coloration: Skin is not jaundiced or pale. Neurological:      General: No focal deficit present.       Mental Status: She is alert and oriented to person, place, and time. Mental status is at baseline. Cranial Nerves: No cranial nerve deficit. Motor: No weakness. Gait: Gait normal.   Psychiatric:         Mood and Affect: Mood normal.         Behavior: Behavior normal.         Thought Content: Thought content normal.         Judgment: Judgment normal.       Office Visit on 08/31/2022   Component Date Value Ref Range Status    Hemoglobin A1c (POC) 08/31/2022 6.0  % Final   Appointment on 08/26/2022   Component Date Value Ref Range Status    WBC 08/26/2022 9.4  3.4 - 10.8 x10E3/uL Final    RBC 08/26/2022 5.53 (A) 3.77 - 5.28 x10E6/uL Final    HGB 08/26/2022 16.8 (A) 11.1 - 15.9 g/dL Final    HCT 08/26/2022 51.1 (A) 34.0 - 46.6 % Final    MCV 08/26/2022 92  79 - 97 fL Final    MCH 08/26/2022 30.4  26.6 - 33.0 pg Final    MCHC 08/26/2022 32.9  31.5 - 35.7 g/dL Final    RDW 08/26/2022 14.0  11.7 - 15.4 % Final    PLATELET 37/50/2061 271  150 - 450 x10E3/uL Final    NEUTROPHILS 08/26/2022 76  Not Estab. % Final    Lymphocytes 08/26/2022 16  Not Estab. % Final    MONOCYTES 08/26/2022 7  Not Estab. % Final    EOSINOPHILS 08/26/2022 1  Not Estab. % Final    BASOPHILS 08/26/2022 0  Not Estab. % Final    ABS. NEUTROPHILS 08/26/2022 7.1 (A) 1.4 - 7.0 x10E3/uL Final    Abs Lymphocytes 08/26/2022 1.5  0.7 - 3.1 x10E3/uL Final    ABS. MONOCYTES 08/26/2022 0.6  0.1 - 0.9 x10E3/uL Final    ABS. EOSINOPHILS 08/26/2022 0.1  0.0 - 0.4 x10E3/uL Final    ABS. BASOPHILS 08/26/2022 0.0  0.0 - 0.2 x10E3/uL Final    IMMATURE GRANULOCYTES 08/26/2022 0  Not Estab. % Final    ABS. IMM.  GRANS. 08/26/2022 0.0  0.0 - 0.1 x10E3/uL Final    Glucose 08/26/2022 123 (A) 65 - 99 mg/dL Final    BUN 08/26/2022 8  6 - 24 mg/dL Final    Creatinine 08/26/2022 0.83  0.57 - 1.00 mg/dL Final    eGFR 08/26/2022 83  >59 mL/min/1.73 Final    BUN/Creatinine ratio 08/26/2022 10  9 - 23 Final    Sodium 08/26/2022 146 (A) 134 - 144 mmol/L Final    Potassium 08/26/2022 3.7  3.5 - 5.2 mmol/L Final    Chloride 08/26/2022 98  96 - 106 mmol/L Final    CO2 08/26/2022 30 (A) 20 - 29 mmol/L Final    Calcium 08/26/2022 10.2  8.7 - 10.2 mg/dL Final    Protein, total 08/26/2022 5.8 (A) 6.0 - 8.5 g/dL Final    Albumin 08/26/2022 3.7 (A) 3.8 - 4.9 g/dL Final    GLOBULIN, TOTAL 08/26/2022 2.1  1.5 - 4.5 g/dL Final    A-G Ratio 08/26/2022 1.8  1.2 - 2.2 Final    Bilirubin, total 08/26/2022 0.3  0.0 - 1.2 mg/dL Final    Alk. phosphatase 08/26/2022 102  44 - 121 IU/L Final    AST (SGOT) 08/26/2022 23  0 - 40 IU/L Final    ALT (SGPT) 08/26/2022 21  0 - 32 IU/L Final    Cholesterol, total 08/26/2022 281 (A) 100 - 199 mg/dL Final    Triglyceride 08/26/2022 208 (A) 0 - 149 mg/dL Final    HDL Cholesterol 08/26/2022 67  >39 mg/dL Final    VLDL, calculated 08/26/2022 38  5 - 40 mg/dL Final    LDL, calculated 08/26/2022 176 (A) 0 - 99 mg/dL Final    Hep C Virus Ab 08/26/2022 <0.1  0.0 - 0.9 s/co ratio Final    Comment:                                   Negative:     < 0.8                               Indeterminate: 0.8 - 0.9                                    Positive:     > 0.9   HCV antibody alone does not differentiate between   previous resolved infection and active infection. The CDC and current clinical guidelines recommend   that a positive HCV antibody result be followed up   with an HCV RNA test to support the diagnosis of   acute HCV infection. [facility] offers Hepatitis C   Virus (HCV) RNA, Diagnosis, [place] (635672) and   Hepatitis C Virus (HCV) Antibody with reflex to   Quantitative Real-time PCR (806091). Results for orders placed or performed in visit on 08/31/22   AMB POC HEMOGLOBIN A1C   Result Value Ref Range    Hemoglobin A1c (POC) 6.0 %       Patient Care Team:  Patient Care Team:  Flavio Smith MD as PCP - General (Internal Medicine Physician)  Marylen Ko, [place] (Physician Assistant)      Assessment / Plan:      ICD-10-CM ICD-9-CM    1. Essential hypertension  I10 401.9       2. Pre-diabetes  R73.03 790.29 AMB POC HEMOGLOBIN A1C      3. Pedal edema  R60.0 782.3 furosemide (LASIX) 40 mg tablet      4. Mixed hyperlipidemia  E78.2 272.2 atorvastatin (LIPITOR) 20 mg tablet      5. Breast cancer screening by mammogram  Z12.31 V76.12 MAI MAMMO BI SCREENING INCL CAD      6. Encounter for immunization  Z23 V03.89 varicella-zoster recombinant, PF, (Shingrix, PF,) 50 mcg/0.5 mL susr injection      7. Gastroesophageal reflux disease without esophagitis  K21.9 530.81 omeprazole (PRILOSEC) 20 mg capsule      8. Osteopenia, unspecified location  M85.80 733.90       9. Encounter for medical examination to establish care  Z00.00 V70.9         HTN: Continue lifestyle modification. Patient does not want to start on medications at this time. Counseled on DASH diet. Prediabetes: HbA1c today was 6%. Discussed lifestyle modification. HLD: Restart back on Lipitor 20 mg daily. Edema: Continue Lasix. Osteopenia: Counseled to take calcium and vitamin D daily. GERD: Continue omeprazole. Prescription for shingles vaccine provided. Mammogram due in 11/22. Order placed. I asked the patient if she  had any questions and answered her  questions. The patient stated that she understands the treatment plan and agrees with the treatment plan    This document was created with a voice activated dictation system and may contain transcription errors.

## 2022-08-31 NOTE — PROGRESS NOTES
1. \"Have you been to the ER, urgent care clinic since your last visit? Hospitalized since your last visit? \" No    2. \"Have you seen or consulted any other health care providers outside of the [address] [place] [address] since your last visit? \" No     3. For patients aged 39-70: Has the patient had a colonoscopy / FIT/ Cologuard? Yes - no Care Gap present      If the patient is female:    4. For patients aged 41-77: Has the patient had a mammogram within the past 2 years? Yes - no Care Gap present      5. For patients aged 21-65: Has the patient had a pap smear?  No

## 2022-12-01 ENCOUNTER — HOSPITAL ENCOUNTER (OUTPATIENT)
Dept: MAMMOGRAPHY | Age: 57
Discharge: HOME | End: 2022-12-01
Attending: STUDENT IN AN ORGANIZED HEALTH CARE EDUCATION/TRAINING PROGRAM
Payer: COMMERCIAL

## 2022-12-01 DIAGNOSIS — Z12.31 BREAST CANCER SCREENING BY MAMMOGRAM: ICD-10-CM

## 2022-12-01 PROCEDURE — 77063 BREAST TOMOSYNTHESIS BI: CPT

## 2023-03-01 ENCOUNTER — OFFICE VISIT (OUTPATIENT)
Facility: CLINIC | Age: 58
End: 2023-03-01
Payer: COMMERCIAL

## 2023-03-01 VITALS
WEIGHT: 190 LBS | OXYGEN SATURATION: 98 % | HEIGHT: 64 IN | BODY MASS INDEX: 32.44 KG/M2 | DIASTOLIC BLOOD PRESSURE: 80 MMHG | RESPIRATION RATE: 16 BRPM | HEART RATE: 88 BPM | SYSTOLIC BLOOD PRESSURE: 120 MMHG

## 2023-03-01 DIAGNOSIS — R60.0 LOCALIZED EDEMA: ICD-10-CM

## 2023-03-01 DIAGNOSIS — D75.1 POLYCYTHEMIA: ICD-10-CM

## 2023-03-01 DIAGNOSIS — R73.03 PREDIABETES: ICD-10-CM

## 2023-03-01 DIAGNOSIS — M79.606 PAIN OF LOWER EXTREMITY, UNSPECIFIED LATERALITY: ICD-10-CM

## 2023-03-01 DIAGNOSIS — I10 ESSENTIAL (PRIMARY) HYPERTENSION: Primary | ICD-10-CM

## 2023-03-01 DIAGNOSIS — E78.2 MIXED HYPERLIPIDEMIA: ICD-10-CM

## 2023-03-01 PROCEDURE — 99214 OFFICE O/P EST MOD 30 MIN: CPT | Performed by: STUDENT IN AN ORGANIZED HEALTH CARE EDUCATION/TRAINING PROGRAM

## 2023-03-01 PROCEDURE — 3079F DIAST BP 80-89 MM HG: CPT | Performed by: STUDENT IN AN ORGANIZED HEALTH CARE EDUCATION/TRAINING PROGRAM

## 2023-03-01 PROCEDURE — 3074F SYST BP LT 130 MM HG: CPT | Performed by: STUDENT IN AN ORGANIZED HEALTH CARE EDUCATION/TRAINING PROGRAM

## 2023-03-01 RX ORDER — ATORVASTATIN CALCIUM 20 MG/1
20 TABLET, FILM COATED ORAL DAILY
COMMUNITY
Start: 2022-08-31 | End: 2023-03-01 | Stop reason: SDUPTHER

## 2023-03-01 RX ORDER — FUROSEMIDE 40 MG/1
40 TABLET ORAL DAILY
COMMUNITY
Start: 2022-08-31 | End: 2023-03-01 | Stop reason: SDUPTHER

## 2023-03-01 RX ORDER — FUROSEMIDE 40 MG/1
40 TABLET ORAL DAILY
Qty: 90 TABLET | Refills: 1 | Status: SHIPPED | OUTPATIENT
Start: 2023-03-01

## 2023-03-01 RX ORDER — ATORVASTATIN CALCIUM 20 MG/1
20 TABLET, FILM COATED ORAL DAILY
Qty: 90 TABLET | Refills: 1 | Status: SHIPPED | OUTPATIENT
Start: 2023-03-01

## 2023-03-01 RX ORDER — NAPROXEN SODIUM 220 MG
220 TABLET ORAL PRN
Qty: 60 TABLET | Refills: 3 | Status: SHIPPED | OUTPATIENT
Start: 2023-03-01

## 2023-03-01 RX ORDER — NAPROXEN SODIUM 220 MG
220 TABLET ORAL PRN
COMMUNITY
End: 2023-03-01 | Stop reason: SDUPTHER

## 2023-03-01 SDOH — ECONOMIC STABILITY: INCOME INSECURITY: HOW HARD IS IT FOR YOU TO PAY FOR THE VERY BASICS LIKE FOOD, HOUSING, MEDICAL CARE, AND HEATING?: SOMEWHAT HARD

## 2023-03-01 SDOH — ECONOMIC STABILITY: HOUSING INSECURITY
IN THE LAST 12 MONTHS, WAS THERE A TIME WHEN YOU DID NOT HAVE A STEADY PLACE TO SLEEP OR SLEPT IN A SHELTER (INCLUDING NOW)?: NO

## 2023-03-01 SDOH — ECONOMIC STABILITY: FOOD INSECURITY: WITHIN THE PAST 12 MONTHS, YOU WORRIED THAT YOUR FOOD WOULD RUN OUT BEFORE YOU GOT MONEY TO BUY MORE.: NEVER TRUE

## 2023-03-01 SDOH — ECONOMIC STABILITY: FOOD INSECURITY: WITHIN THE PAST 12 MONTHS, THE FOOD YOU BOUGHT JUST DIDN'T LAST AND YOU DIDN'T HAVE MONEY TO GET MORE.: NEVER TRUE

## 2023-03-01 ASSESSMENT — PATIENT HEALTH QUESTIONNAIRE - PHQ9
1. LITTLE INTEREST OR PLEASURE IN DOING THINGS: 0
2. FEELING DOWN, DEPRESSED OR HOPELESS: 0
SUM OF ALL RESPONSES TO PHQ QUESTIONS 1-9: 0
SUM OF ALL RESPONSES TO PHQ QUESTIONS 1-9: 0
SUM OF ALL RESPONSES TO PHQ9 QUESTIONS 1 & 2: 0
SUM OF ALL RESPONSES TO PHQ QUESTIONS 1-9: 0
SUM OF ALL RESPONSES TO PHQ QUESTIONS 1-9: 0

## 2023-03-01 ASSESSMENT — ENCOUNTER SYMPTOMS
ABDOMINAL PAIN: 0
DIARRHEA: 0
VOMITING: 0
BLOOD IN STOOL: 0
BACK PAIN: 0
CHEST TIGHTNESS: 0
WHEEZING: 0
COUGH: 0
SHORTNESS OF BREATH: 0
RHINORRHEA: 0
NAUSEA: 0

## 2023-03-01 NOTE — PROGRESS NOTES
Arron Lobo is a 56 y.o. female presenting today for Follow-up Chronic Condition (Denies any concerns. )  . Chief Complaint   Patient presents with    Follow-up Chronic Condition     Denies any concerns. HPI:  Arron Lobo presents to the office today for follow up. Patient has a past medical history of HLD, prediabetes, HTN, GERD. HTN: Patient has been working on lifestyle modification. She is currently not on any medications. She has been eating healthier, cut down on salt. BP today is 120/80. Chronic lower extremity swelling: Patient has a history of chronic lower extremity lymphedema but has previously not been interested in being referred to the lymphedema clinic. She is prescribed furosemide daily. HLD: Patient was resumed back on Lipitor last visit. Denies any myalgias. Lipid panel in 8/26/2022 showed , triglycerides 288, cholesterol total 281, HDL 67.  When she was taking the Lipitor, LDL had improved to 117. Prediabetes: HbA1c was 6% in 8/22. GERD: Taking prilosec 20 mg OTC. Ex-smoker: Patient stopped smoking 4 years ago. Osteopenia: DEXA scan in 2019 showed low bone density. She is currently not taking any calcium or vitamin D supplements. Review of Systems   Constitutional:  Negative for activity change, appetite change, chills, diaphoresis, fatigue, fever and unexpected weight change. HENT:  Negative for congestion, nosebleeds, postnasal drip and rhinorrhea. Respiratory:  Negative for cough, chest tightness, shortness of breath and wheezing. Cardiovascular:  Positive for leg swelling. Negative for chest pain. Gastrointestinal:  Negative for abdominal pain, blood in stool, diarrhea, nausea and vomiting. Heartburn   Endocrine: Negative for polydipsia and polyphagia. Genitourinary:  Negative for decreased urine volume, dysuria, frequency and pelvic pain. Musculoskeletal:  Negative for back pain, myalgias and neck pain. Neurological:  Negative for dizziness, tremors, seizures, syncope, speech difficulty, weakness, numbness and headaches. Psychiatric/Behavioral:  Negative for agitation and confusion. The patient is not nervous/anxious. All other systems reviewed and are negative. No Known Allergies    PHQ Screening   No flowsheet data found. History  Past Medical History:   Diagnosis Date    GERD (gastroesophageal reflux disease)     Musculoskeletal disorder        Past Surgical History:   Procedure Laterality Date    CERVICAL FUSION      plated for fracture    KNEE ARTHROSCOPY Right medial meniscus tear    ORTHOPEDIC SURGERY Right ACL resection       Social History     Socioeconomic History    Marital status: Single     Spouse name: Not on file    Number of children: Not on file    Years of education: Not on file    Highest education level: Not on file   Occupational History    Not on file   Tobacco Use    Smoking status: Former     Packs/day: 0.50     Years: 30.00     Pack years: 15.00     Types: Cigarettes     Quit date: 2018     Years since quittin.0    Smokeless tobacco: Never   Vaping Use    Vaping Use: Never used   Substance and Sexual Activity    Alcohol use: Yes    Drug use: No    Sexual activity: Not on file   Other Topics Concern    Not on file   Social History Narrative    Not on file     Social Determinants of Health     Financial Resource Strain: Medium Risk    Difficulty of Paying Living Expenses: Somewhat hard   Food Insecurity: No Food Insecurity    Worried About Running Out of Food in the Last Year: Never true    Ran Out of Food in the Last Year: Never true   Transportation Needs: Unknown    Lack of Transportation (Medical): Not on file    Lack of Transportation (Non-Medical):  No   Physical Activity: Insufficiently Active    Days of Exercise per Week: 2 days    Minutes of Exercise per Session: 30 min   Stress: Not on file   Social Connections: Not on file   Intimate Partner Violence: Not At Risk    Fear of Current or Ex-Partner: No    Emotionally Abused: No    Physically Abused: No    Sexually Abused: No   Housing Stability: Unknown    Unable to Pay for Housing in the Last Year: Not on file    Number of Places Lived in the Last Year: Not on file    Unstable Housing in the Last Year: No       Current Outpatient Medications   Medication Sig Dispense Refill    atorvastatin (LIPITOR) 20 MG tablet Take 1 tablet by mouth daily 90 tablet 1    furosemide (LASIX) 40 MG tablet Take 1 tablet by mouth daily 90 tablet 1    naproxen sodium (ANAPROX) 220 MG tablet Take 1 tablet by mouth as needed for Pain 60 tablet 3     No current facility-administered medications for this visit. /80 (Site: Right Upper Arm, Position: Sitting, Cuff Size: Large Adult) Comment: takes meds daily  Pulse 88   Resp 16   Ht 5' 4\" (1.626 m)   Wt 190 lb (86.2 kg)   SpO2 98%   BMI 32.61 kg/m²      Physical Exam  Vitals and nursing note reviewed. Constitutional:       General: She is not in acute distress. Appearance: Normal appearance. She is not ill-appearing, toxic-appearing or diaphoretic. HENT:      Head: Normocephalic and atraumatic. Eyes:      General: No scleral icterus. Extraocular Movements: Extraocular movements intact. Conjunctiva/sclera: Conjunctivae normal.      Pupils: Pupils are equal, round, and reactive to light. Cardiovascular:      Rate and Rhythm: Normal rate and regular rhythm. Pulses: Normal pulses. Heart sounds: Normal heart sounds. No murmur heard. Pulmonary:      Effort: Pulmonary effort is normal. No respiratory distress. Breath sounds: Normal breath sounds. Musculoskeletal:         General: Normal range of motion. Cervical back: Normal range of motion and neck supple. Right lower leg: Edema present. Left lower leg: Edema present. Skin:     General: Skin is warm and dry. Neurological:      General: No focal deficit present.       Mental Status: She is alert and oriented to person, place, and time. Mental status is at baseline. Cranial Nerves: No cranial nerve deficit. Motor: No weakness. Gait: Gait normal.   Psychiatric:         Mood and Affect: Mood normal.         Behavior: Behavior normal.         Thought Content: Thought content normal.         Judgment: Judgment normal.        No visits with results within 3 Month(s) from this visit. Latest known visit with results is:   Office Visit on 08/31/2022   Component Date Value Ref Range Status    Hemoglobin A1C, POC 08/31/2022 6.0  % Final       No results found for any visits on 03/01/23. Patient Care Team:  Patient Care Team:  Nanci Mathew MD as PCP - Miguel A Montana MD as PCP - Pito Provider      Assessment / Plan:     Diagnosis Orders   1. Essential (primary) hypertension  CBC with Auto Differential      2. Polycythemia  CBC with Auto Differential      3. Prediabetes  Hemoglobin A1C      4. Mixed hyperlipidemia  atorvastatin (LIPITOR) 20 MG tablet    Lipid Panel    Comprehensive Metabolic Panel      5. Localized edema  furosemide (LASIX) 40 MG tablet      6. Pain of lower extremity, unspecified laterality  naproxen sodium (ANAPROX) 220 MG tablet             HTN: Continue lifestyle modification. Patient has lost 7 pounds and has been monitoring her diet. BP today is 120 /80. [place] Brian Counseled on DASH diet. Polycythemia: Last CBC showed hemoglobin 16.8. Will check repeat. Prediabetes: Check repeat HbA1c.    HLD: Continue Lipitor. Check repeat lipid panel. Edema: Continue Lasix. Improved. Continue wearing compression stockings. Check BMP. Osteopenia: Counseled to take calcium and vitamin D daily. GERD: Continue omeprazole. Patient states she has already received the flu shot in October. Advised to get shingles vaccine. Return in about 6 months (around 9/1/2023).      I asked the patient if she  had any questions and answered her questions. The patient stated that she understands the treatment plan and agrees with the treatment plan    This document was created with a voice activated dictation system and may contain transcription errors.

## 2023-03-14 ENCOUNTER — HOSPITAL ENCOUNTER (OUTPATIENT)
Facility: HOSPITAL | Age: 58
Setting detail: SPECIMEN
Discharge: HOME | End: 2023-03-17

## 2023-03-14 LAB — LABCORP SPECIMEN COLLECTION: NORMAL

## 2023-03-14 PROCEDURE — 99001 SPECIMEN HANDLING PT-LAB: CPT

## 2023-03-17 ENCOUNTER — TELEPHONE (OUTPATIENT)
Facility: CLINIC | Age: 58
End: 2023-03-17

## 2023-03-17 DIAGNOSIS — E87.6 HYPOKALEMIA: Primary | ICD-10-CM

## 2023-03-17 RX ORDER — POTASSIUM CHLORIDE 20 MEQ/1
TABLET, EXTENDED RELEASE ORAL
Qty: 90 TABLET | Refills: 1 | Status: SHIPPED | OUTPATIENT
Start: 2023-03-17

## 2023-03-17 NOTE — TELEPHONE ENCOUNTER
Patient found to have low potassium: 2.6. I have prescribed potassium pills. Please advise her to pick it up from the pharmacy.

## 2023-03-24 NOTE — TELEPHONE ENCOUNTER
Called patient left message to call back to the office to schedule appointment [email or web address]

## 2023-09-01 ENCOUNTER — OFFICE VISIT (OUTPATIENT)
Facility: CLINIC | Age: 58
End: 2023-09-01
Payer: COMMERCIAL

## 2023-09-01 VITALS
RESPIRATION RATE: 16 BRPM | SYSTOLIC BLOOD PRESSURE: 118 MMHG | WEIGHT: 178 LBS | BODY MASS INDEX: 30.39 KG/M2 | HEART RATE: 86 BPM | OXYGEN SATURATION: 96 % | DIASTOLIC BLOOD PRESSURE: 75 MMHG | HEIGHT: 64 IN

## 2023-09-01 DIAGNOSIS — L98.9 SKIN LESION: ICD-10-CM

## 2023-09-01 DIAGNOSIS — R79.89 ELEVATED LFTS: ICD-10-CM

## 2023-09-01 DIAGNOSIS — I10 ESSENTIAL (PRIMARY) HYPERTENSION: ICD-10-CM

## 2023-09-01 DIAGNOSIS — E87.6 HYPOKALEMIA: Primary | ICD-10-CM

## 2023-09-01 DIAGNOSIS — E66.9 CLASS 1 OBESITY WITH SERIOUS COMORBIDITY AND BODY MASS INDEX (BMI) OF 30.0 TO 30.9 IN ADULT, UNSPECIFIED OBESITY TYPE: ICD-10-CM

## 2023-09-01 DIAGNOSIS — E11.65 TYPE 2 DIABETES MELLITUS WITH HYPERGLYCEMIA, WITHOUT LONG-TERM CURRENT USE OF INSULIN (HCC): ICD-10-CM

## 2023-09-01 DIAGNOSIS — E78.2 MIXED HYPERLIPIDEMIA: ICD-10-CM

## 2023-09-01 PROBLEM — R73.03 PREDIABETES: Status: RESOLVED | Noted: 2021-08-31 | Resolved: 2023-09-01

## 2023-09-01 LAB — HBA1C MFR BLD: 5.9 %

## 2023-09-01 PROCEDURE — 99214 OFFICE O/P EST MOD 30 MIN: CPT | Performed by: STUDENT IN AN ORGANIZED HEALTH CARE EDUCATION/TRAINING PROGRAM

## 2023-09-01 PROCEDURE — 3074F SYST BP LT 130 MM HG: CPT | Performed by: STUDENT IN AN ORGANIZED HEALTH CARE EDUCATION/TRAINING PROGRAM

## 2023-09-01 PROCEDURE — 3078F DIAST BP <80 MM HG: CPT | Performed by: STUDENT IN AN ORGANIZED HEALTH CARE EDUCATION/TRAINING PROGRAM

## 2023-09-01 PROCEDURE — 3044F HG A1C LEVEL LT 7.0%: CPT | Performed by: STUDENT IN AN ORGANIZED HEALTH CARE EDUCATION/TRAINING PROGRAM

## 2023-09-01 PROCEDURE — 83036 HEMOGLOBIN GLYCOSYLATED A1C: CPT | Performed by: STUDENT IN AN ORGANIZED HEALTH CARE EDUCATION/TRAINING PROGRAM

## 2023-09-01 SDOH — ECONOMIC STABILITY: FOOD INSECURITY: WITHIN THE PAST 12 MONTHS, YOU WORRIED THAT YOUR FOOD WOULD RUN OUT BEFORE YOU GOT MONEY TO BUY MORE.: NEVER TRUE

## 2023-09-01 SDOH — ECONOMIC STABILITY: FOOD INSECURITY: WITHIN THE PAST 12 MONTHS, THE FOOD YOU BOUGHT JUST DIDN'T LAST AND YOU DIDN'T HAVE MONEY TO GET MORE.: NEVER TRUE

## 2023-09-01 SDOH — ECONOMIC STABILITY: INCOME INSECURITY: HOW HARD IS IT FOR YOU TO PAY FOR THE VERY BASICS LIKE FOOD, HOUSING, MEDICAL CARE, AND HEATING?: NOT VERY HARD

## 2023-09-01 ASSESSMENT — PATIENT HEALTH QUESTIONNAIRE - PHQ9
SUM OF ALL RESPONSES TO PHQ QUESTIONS 1-9: 0
SUM OF ALL RESPONSES TO PHQ QUESTIONS 1-9: 0
2. FEELING DOWN, DEPRESSED OR HOPELESS: 0
SUM OF ALL RESPONSES TO PHQ9 QUESTIONS 1 & 2: 0
SUM OF ALL RESPONSES TO PHQ QUESTIONS 1-9: 0
1. LITTLE INTEREST OR PLEASURE IN DOING THINGS: 0
SUM OF ALL RESPONSES TO PHQ QUESTIONS 1-9: 0

## 2023-09-01 ASSESSMENT — ANXIETY QUESTIONNAIRES
GAD7 TOTAL SCORE: 0
2. NOT BEING ABLE TO STOP OR CONTROL WORRYING: 0
IF YOU CHECKED OFF ANY PROBLEMS ON THIS QUESTIONNAIRE, HOW DIFFICULT HAVE THESE PROBLEMS MADE IT FOR YOU TO DO YOUR WORK, TAKE CARE OF THINGS AT HOME, OR GET ALONG WITH OTHER PEOPLE: NOT DIFFICULT AT ALL
7. FEELING AFRAID AS IF SOMETHING AWFUL MIGHT HAPPEN: 0
1. FEELING NERVOUS, ANXIOUS, OR ON EDGE: 0
4. TROUBLE RELAXING: 0
3. WORRYING TOO MUCH ABOUT DIFFERENT THINGS: 0
5. BEING SO RESTLESS THAT IT IS HARD TO SIT STILL: 0
6. BECOMING EASILY ANNOYED OR IRRITABLE: 0

## 2023-09-01 ASSESSMENT — ENCOUNTER SYMPTOMS
DIARRHEA: 0
BACK PAIN: 0
BLOOD IN STOOL: 0
ABDOMINAL PAIN: 0
SHORTNESS OF BREATH: 0
CHEST TIGHTNESS: 0
COUGH: 0
WHEEZING: 0
NAUSEA: 0
RHINORRHEA: 0
VOMITING: 0

## 2023-09-01 NOTE — PROGRESS NOTES
Min Triana is a 66 y.o. female presenting today for Follow-up Chronic Condition (Pt c/o bump under the bottom of top lip )  . Chief Complaint   Patient presents with    Follow-up Chronic Condition     Pt c/o bump under the bottom of top lip        HPI:  Min Triana presents to the office today for follow up. Patient has a past medical history of HLD, T2DM, HTN, GERD. HTN: Patient has been working on lifestyle modification. She is currently not on any medications. She has been eating healthier, cut down on salt. BP today is 118/75. Chronic lower extremity swelling: Patient has a history of chronic lower extremity lymphedema but has previously not been interested in being referred to the lymphedema clinic. She is prescribed furosemide daily. HLD: Patient was resumed back on Lipitor last visit. Denies any myalgias. Lipid panel in 3/23 shows , HDL 68, triglycerides 138. T2DM: Last HbA1c was 6.5%. GERD: Taking prilosec 20 mg OTC. Ex-smoker: Patient stopped smoking 4 years ago. Osteopenia: DEXA scan in 2019 showed low bone density. She is currently not taking any calcium or vitamin D supplements. Labs previously show hypokalemia and elevated LFTs. She was prescribed potassium supplements. Patient reports she has been taking them daily. She reports alcohol use a few times a week. Today, she is complaining of a lesion under her top lip. It occurred when her lip got caught between her teeth. Since then there has been a small growth that she would like to have removed. Review of Systems   Constitutional:  Negative for activity change, appetite change, chills, diaphoresis, fatigue, fever and unexpected weight change. HENT:  Negative for congestion, nosebleeds, postnasal drip and rhinorrhea. Respiratory:  Negative for cough, chest tightness, shortness of breath and wheezing. Cardiovascular:  Positive for leg swelling. Negative for chest pain.

## 2023-09-09 DIAGNOSIS — E78.2 MIXED HYPERLIPIDEMIA: ICD-10-CM

## 2023-09-09 DIAGNOSIS — R60.0 LOCALIZED EDEMA: ICD-10-CM

## 2023-09-11 DIAGNOSIS — E87.6 HYPOKALEMIA: ICD-10-CM

## 2023-09-15 RX ORDER — POTASSIUM CHLORIDE 1500 MG/1
TABLET, EXTENDED RELEASE ORAL
Qty: 90 TABLET | Refills: 0 | Status: SHIPPED | OUTPATIENT
Start: 2023-09-15

## 2023-09-15 RX ORDER — FUROSEMIDE 40 MG/1
40 TABLET ORAL DAILY
Qty: 90 TABLET | Refills: 1 | Status: SHIPPED | OUTPATIENT
Start: 2023-09-15

## 2023-09-15 RX ORDER — ATORVASTATIN CALCIUM 20 MG/1
20 TABLET, FILM COATED ORAL DAILY
Qty: 90 TABLET | Refills: 1 | Status: SHIPPED | OUTPATIENT
Start: 2023-09-15

## 2023-09-20 LAB
ALBUMIN SERPL-MCNC: 3.7 G/DL (ref 3.8–4.9)
ALBUMIN/GLOB SERPL: 1.7 {RATIO} (ref 1.2–2.2)
ALP SERPL-CCNC: 107 IU/L (ref 44–121)
ALT SERPL-CCNC: 30 IU/L (ref 0–32)
AST SERPL-CCNC: 41 IU/L (ref 0–40)
BILIRUB SERPL-MCNC: 0.4 MG/DL (ref 0–1.2)
BUN SERPL-MCNC: 5 MG/DL (ref 6–24)
BUN/CREAT SERPL: 7 (ref 9–23)
CALCIUM SERPL-MCNC: 9.7 MG/DL (ref 8.7–10.2)
CHLORIDE SERPL-SCNC: 96 MMOL/L (ref 96–106)
CO2 SERPL-SCNC: 26 MMOL/L (ref 20–29)
CREAT SERPL-MCNC: 0.71 MG/DL (ref 0.57–1)
EGFRCR SERPLBLD CKD-EPI 2021: 100 ML/MIN/1.73
GLOBULIN SER CALC-MCNC: 2.2 G/DL (ref 1.5–4.5)
GLUCOSE SERPL-MCNC: 137 MG/DL (ref 70–99)
POTASSIUM SERPL-SCNC: 3.6 MMOL/L (ref 3.5–5.2)
PROT SERPL-MCNC: 5.9 G/DL (ref 6–8.5)
SODIUM SERPL-SCNC: 143 MMOL/L (ref 134–144)
SPECIMEN STATUS REPORT: NORMAL

## 2023-09-21 LAB
ALBUMIN/CREAT UR: 6 MG/G CREAT (ref 0–29)
CREAT UR-MCNC: 112.7 MG/DL
HAV IGM SERPL QL IA: NEGATIVE
HBV CORE IGM SERPL QL IA: NEGATIVE
HBV SURFACE AG SERPL QL IA: NEGATIVE
HCV AB SERPL QL IA: NORMAL
HCV IGG SERPL QL IA: NON REACTIVE
MICROALBUMIN UR-MCNC: 6.6 UG/ML

## 2024-01-05 ENCOUNTER — OFFICE VISIT (OUTPATIENT)
Facility: CLINIC | Age: 59
End: 2024-01-05
Payer: COMMERCIAL

## 2024-01-05 VITALS
WEIGHT: 184 LBS | HEART RATE: 86 BPM | SYSTOLIC BLOOD PRESSURE: 131 MMHG | OXYGEN SATURATION: 99 % | RESPIRATION RATE: 16 BRPM | TEMPERATURE: 97.6 F | HEIGHT: 64 IN | DIASTOLIC BLOOD PRESSURE: 82 MMHG | BODY MASS INDEX: 31.41 KG/M2

## 2024-01-05 DIAGNOSIS — E78.2 MIXED HYPERLIPIDEMIA: ICD-10-CM

## 2024-01-05 DIAGNOSIS — R79.89 ELEVATED LFTS: ICD-10-CM

## 2024-01-05 DIAGNOSIS — I10 ESSENTIAL (PRIMARY) HYPERTENSION: ICD-10-CM

## 2024-01-05 DIAGNOSIS — E11.65 TYPE 2 DIABETES MELLITUS WITH HYPERGLYCEMIA, WITHOUT LONG-TERM CURRENT USE OF INSULIN (HCC): Primary | ICD-10-CM

## 2024-01-05 DIAGNOSIS — E87.6 HYPOKALEMIA: ICD-10-CM

## 2024-01-05 DIAGNOSIS — Z11.59 NEED FOR HEPATITIS B SCREENING TEST: ICD-10-CM

## 2024-01-05 DIAGNOSIS — Z12.31 BREAST CANCER SCREENING BY MAMMOGRAM: ICD-10-CM

## 2024-01-05 DIAGNOSIS — F10.10 ALCOHOL ABUSE: ICD-10-CM

## 2024-01-05 DIAGNOSIS — Z23 ENCOUNTER FOR IMMUNIZATION: ICD-10-CM

## 2024-01-05 DIAGNOSIS — I89.0 LYMPHEDEMA: ICD-10-CM

## 2024-01-05 LAB — HBA1C MFR BLD: 6.2 %

## 2024-01-05 PROCEDURE — 90471 IMMUNIZATION ADMIN: CPT | Performed by: STUDENT IN AN ORGANIZED HEALTH CARE EDUCATION/TRAINING PROGRAM

## 2024-01-05 PROCEDURE — 3079F DIAST BP 80-89 MM HG: CPT | Performed by: STUDENT IN AN ORGANIZED HEALTH CARE EDUCATION/TRAINING PROGRAM

## 2024-01-05 PROCEDURE — 99214 OFFICE O/P EST MOD 30 MIN: CPT | Performed by: STUDENT IN AN ORGANIZED HEALTH CARE EDUCATION/TRAINING PROGRAM

## 2024-01-05 PROCEDURE — 83036 HEMOGLOBIN GLYCOSYLATED A1C: CPT | Performed by: STUDENT IN AN ORGANIZED HEALTH CARE EDUCATION/TRAINING PROGRAM

## 2024-01-05 PROCEDURE — 90677 PCV20 VACCINE IM: CPT | Performed by: STUDENT IN AN ORGANIZED HEALTH CARE EDUCATION/TRAINING PROGRAM

## 2024-01-05 PROCEDURE — 3075F SYST BP GE 130 - 139MM HG: CPT | Performed by: STUDENT IN AN ORGANIZED HEALTH CARE EDUCATION/TRAINING PROGRAM

## 2024-01-05 SDOH — ECONOMIC STABILITY: FOOD INSECURITY: WITHIN THE PAST 12 MONTHS, THE FOOD YOU BOUGHT JUST DIDN'T LAST AND YOU DIDN'T HAVE MONEY TO GET MORE.: NEVER TRUE

## 2024-01-05 SDOH — ECONOMIC STABILITY: INCOME INSECURITY: HOW HARD IS IT FOR YOU TO PAY FOR THE VERY BASICS LIKE FOOD, HOUSING, MEDICAL CARE, AND HEATING?: NOT HARD AT ALL

## 2024-01-05 SDOH — ECONOMIC STABILITY: FOOD INSECURITY: WITHIN THE PAST 12 MONTHS, YOU WORRIED THAT YOUR FOOD WOULD RUN OUT BEFORE YOU GOT MONEY TO BUY MORE.: NEVER TRUE

## 2024-01-05 ASSESSMENT — ANXIETY QUESTIONNAIRES
5. BEING SO RESTLESS THAT IT IS HARD TO SIT STILL: 0
7. FEELING AFRAID AS IF SOMETHING AWFUL MIGHT HAPPEN: 0
6. BECOMING EASILY ANNOYED OR IRRITABLE: 0
2. NOT BEING ABLE TO STOP OR CONTROL WORRYING: 0
IF YOU CHECKED OFF ANY PROBLEMS ON THIS QUESTIONNAIRE, HOW DIFFICULT HAVE THESE PROBLEMS MADE IT FOR YOU TO DO YOUR WORK, TAKE CARE OF THINGS AT HOME, OR GET ALONG WITH OTHER PEOPLE: NOT DIFFICULT AT ALL
1. FEELING NERVOUS, ANXIOUS, OR ON EDGE: 0
4. TROUBLE RELAXING: 0
GAD7 TOTAL SCORE: 0
3. WORRYING TOO MUCH ABOUT DIFFERENT THINGS: 0

## 2024-01-05 ASSESSMENT — ENCOUNTER SYMPTOMS
NAUSEA: 0
BACK PAIN: 0
WHEEZING: 0
RHINORRHEA: 0
ABDOMINAL PAIN: 0
VOMITING: 0
CHEST TIGHTNESS: 0
BLOOD IN STOOL: 0
COUGH: 0
DIARRHEA: 0
SHORTNESS OF BREATH: 0

## 2024-01-05 NOTE — PROGRESS NOTES
Lymphedema        7. Breast cancer screening by mammogram  HEMALATHA DIGITAL SCREEN W OR WO CAD BILATERAL      8. Need for hepatitis B screening test  Hepatitis B Surface Antibody      9. Encounter for immunization  Pneumococcal, PCV20, PREVNAR 20, (age 6w+), IM, PF      10. Alcohol abuse             HTN: BP acceptable today.  Currently not on any medications.    T2DM: A1c is 6.2% today.  Counseled on lifestyle changes..  Advised to schedule eye exam -she has an ophthalmologist.  Foot exam completed.  Check urine microalbumin.    Alcohol abuse: Discussed that elevated LFTs and facial rash is related to alcohol use.  Counseled on moderation.    HLD: Continue Lipitor.  Check repeat lipid panel.    Edema: Continue Lasix.  Continue wearing compression stockings.      Obesity: Patient encouraged to lose weight.      Osteopenia: Counseled to take calcium and vitamin D daily.    GERD: Continue omeprazole.    Patient reports she has received the flu vaccine.  Advised to get shingles vaccine.      Return in about 6 months (around 7/5/2024).     I asked the patient if she  had any questions and answered her  questions.  The patient stated that she understands the treatment plan and agrees with the treatment plan    This document was created with a voice activated dictation system and may contain transcription errors.

## 2024-03-18 DIAGNOSIS — E87.6 HYPOKALEMIA: ICD-10-CM

## 2024-03-18 DIAGNOSIS — R60.0 LOCALIZED EDEMA: ICD-10-CM

## 2024-03-18 DIAGNOSIS — E78.2 MIXED HYPERLIPIDEMIA: ICD-10-CM

## 2024-03-20 RX ORDER — POTASSIUM CHLORIDE 20 MEQ/1
TABLET, EXTENDED RELEASE ORAL
Qty: 90 TABLET | Refills: 1 | Status: SHIPPED | OUTPATIENT
Start: 2024-03-20

## 2024-03-20 RX ORDER — ATORVASTATIN CALCIUM 20 MG/1
20 TABLET, FILM COATED ORAL DAILY
Qty: 90 TABLET | Refills: 1 | Status: SHIPPED | OUTPATIENT
Start: 2024-03-20

## 2024-03-20 RX ORDER — FUROSEMIDE 40 MG/1
40 TABLET ORAL DAILY
Qty: 90 TABLET | Refills: 1 | Status: SHIPPED | OUTPATIENT
Start: 2024-03-20

## 2024-07-01 ENCOUNTER — HOSPITAL ENCOUNTER (OUTPATIENT)
Facility: HOSPITAL | Age: 59
Setting detail: SPECIMEN
Discharge: HOME | End: 2024-07-04

## 2024-07-01 LAB — LABCORP SPECIMEN COLLECTION: NORMAL

## 2024-07-01 PROCEDURE — 99001 SPECIMEN HANDLING PT-LAB: CPT

## 2024-07-02 LAB
ALBUMIN SERPL-MCNC: 3.2 G/DL (ref 3.8–4.9)
ALBUMIN/CREAT UR: <10 MG/G CREAT (ref 0–29)
ALP SERPL-CCNC: 90 IU/L (ref 44–121)
ALT SERPL-CCNC: 23 IU/L (ref 0–32)
AST SERPL-CCNC: 26 IU/L (ref 0–40)
BASOPHILS # BLD AUTO: 0 X10E3/UL (ref 0–0.2)
BASOPHILS NFR BLD AUTO: 0 %
BILIRUB SERPL-MCNC: 0.3 MG/DL (ref 0–1.2)
BUN SERPL-MCNC: 8 MG/DL (ref 6–24)
BUN/CREAT SERPL: 11 (ref 9–23)
CALCIUM SERPL-MCNC: 8.4 MG/DL (ref 8.7–10.2)
CHLORIDE SERPL-SCNC: 98 MMOL/L (ref 96–106)
CHOLEST SERPL-MCNC: 212 MG/DL (ref 100–199)
CO2 SERPL-SCNC: 29 MMOL/L (ref 20–29)
CREAT SERPL-MCNC: 0.72 MG/DL (ref 0.57–1)
CREAT UR-MCNC: 29.7 MG/DL
EGFRCR SERPLBLD CKD-EPI 2021: 97 ML/MIN/1.73
EOSINOPHIL # BLD AUTO: 0.1 X10E3/UL (ref 0–0.4)
EOSINOPHIL NFR BLD AUTO: 1 %
ERYTHROCYTE [DISTWIDTH] IN BLOOD BY AUTOMATED COUNT: 13 % (ref 11.7–15.4)
GLOBULIN SER CALC-MCNC: 1.9 G/DL (ref 1.5–4.5)
GLUCOSE SERPL-MCNC: 120 MG/DL (ref 70–99)
HBA1C MFR BLD: 6.4 % (ref 4.8–5.6)
HBV SURFACE AB SER QL: NON REACTIVE
HCT VFR BLD AUTO: 40 % (ref 34–46.6)
HDLC SERPL-MCNC: 68 MG/DL
HGB BLD-MCNC: 13.5 G/DL (ref 11.1–15.9)
IMM GRANULOCYTES # BLD AUTO: 0 X10E3/UL (ref 0–0.1)
IMM GRANULOCYTES NFR BLD AUTO: 0 %
LDLC SERPL CALC-MCNC: 116 MG/DL (ref 0–99)
LYMPHOCYTES # BLD AUTO: 1.1 X10E3/UL (ref 0.7–3.1)
LYMPHOCYTES NFR BLD AUTO: 17 %
MCH RBC QN AUTO: 32.7 PG (ref 26.6–33)
MCHC RBC AUTO-ENTMCNC: 33.8 G/DL (ref 31.5–35.7)
MCV RBC AUTO: 97 FL (ref 79–97)
MICROALBUMIN UR-MCNC: <3 UG/ML
MONOCYTES # BLD AUTO: 0.5 X10E3/UL (ref 0.1–0.9)
MONOCYTES NFR BLD AUTO: 7 %
NEUTROPHILS # BLD AUTO: 4.9 X10E3/UL (ref 1.4–7)
NEUTROPHILS NFR BLD AUTO: 75 %
PLATELET # BLD AUTO: 333 X10E3/UL (ref 150–450)
POTASSIUM SERPL-SCNC: 3.5 MMOL/L (ref 3.5–5.2)
PROT SERPL-MCNC: 5.1 G/DL (ref 6–8.5)
RBC # BLD AUTO: 4.13 X10E6/UL (ref 3.77–5.28)
SODIUM SERPL-SCNC: 139 MMOL/L (ref 134–144)
SPECIMEN STATUS REPORT: NORMAL
TRIGL SERPL-MCNC: 163 MG/DL (ref 0–149)
VLDLC SERPL CALC-MCNC: 28 MG/DL (ref 5–40)
WBC # BLD AUTO: 6.6 X10E3/UL (ref 3.4–10.8)

## 2024-07-12 ENCOUNTER — OFFICE VISIT (OUTPATIENT)
Facility: CLINIC | Age: 59
End: 2024-07-12
Payer: COMMERCIAL

## 2024-07-12 VITALS
DIASTOLIC BLOOD PRESSURE: 79 MMHG | HEART RATE: 83 BPM | HEIGHT: 64 IN | WEIGHT: 183 LBS | OXYGEN SATURATION: 99 % | RESPIRATION RATE: 18 BRPM | SYSTOLIC BLOOD PRESSURE: 123 MMHG | BODY MASS INDEX: 31.24 KG/M2

## 2024-07-12 DIAGNOSIS — I10 ESSENTIAL (PRIMARY) HYPERTENSION: ICD-10-CM

## 2024-07-12 DIAGNOSIS — F10.10 ALCOHOL ABUSE: ICD-10-CM

## 2024-07-12 DIAGNOSIS — E87.6 HYPOKALEMIA: ICD-10-CM

## 2024-07-12 DIAGNOSIS — K21.9 GASTRO-ESOPHAGEAL REFLUX DISEASE WITHOUT ESOPHAGITIS: ICD-10-CM

## 2024-07-12 DIAGNOSIS — E11.65 TYPE 2 DIABETES MELLITUS WITH HYPERGLYCEMIA, WITHOUT LONG-TERM CURRENT USE OF INSULIN (HCC): Primary | ICD-10-CM

## 2024-07-12 DIAGNOSIS — E78.2 MIXED HYPERLIPIDEMIA: ICD-10-CM

## 2024-07-12 DIAGNOSIS — I89.0 LYMPHEDEMA: ICD-10-CM

## 2024-07-12 DIAGNOSIS — Z12.11 SCREENING FOR COLON CANCER: ICD-10-CM

## 2024-07-12 DIAGNOSIS — R60.0 LOCALIZED EDEMA: ICD-10-CM

## 2024-07-12 PROCEDURE — 3044F HG A1C LEVEL LT 7.0%: CPT | Performed by: STUDENT IN AN ORGANIZED HEALTH CARE EDUCATION/TRAINING PROGRAM

## 2024-07-12 PROCEDURE — 99214 OFFICE O/P EST MOD 30 MIN: CPT | Performed by: STUDENT IN AN ORGANIZED HEALTH CARE EDUCATION/TRAINING PROGRAM

## 2024-07-12 PROCEDURE — 3078F DIAST BP <80 MM HG: CPT | Performed by: STUDENT IN AN ORGANIZED HEALTH CARE EDUCATION/TRAINING PROGRAM

## 2024-07-12 PROCEDURE — 3074F SYST BP LT 130 MM HG: CPT | Performed by: STUDENT IN AN ORGANIZED HEALTH CARE EDUCATION/TRAINING PROGRAM

## 2024-07-12 RX ORDER — ATORVASTATIN CALCIUM 40 MG/1
40 TABLET, FILM COATED ORAL DAILY
Qty: 90 TABLET | Refills: 1 | Status: SHIPPED | OUTPATIENT
Start: 2024-07-12

## 2024-07-12 SDOH — ECONOMIC STABILITY: FOOD INSECURITY: WITHIN THE PAST 12 MONTHS, THE FOOD YOU BOUGHT JUST DIDN'T LAST AND YOU DIDN'T HAVE MONEY TO GET MORE.: NEVER TRUE

## 2024-07-12 SDOH — ECONOMIC STABILITY: FOOD INSECURITY: WITHIN THE PAST 12 MONTHS, YOU WORRIED THAT YOUR FOOD WOULD RUN OUT BEFORE YOU GOT MONEY TO BUY MORE.: NEVER TRUE

## 2024-07-12 SDOH — ECONOMIC STABILITY: INCOME INSECURITY: HOW HARD IS IT FOR YOU TO PAY FOR THE VERY BASICS LIKE FOOD, HOUSING, MEDICAL CARE, AND HEATING?: NOT HARD AT ALL

## 2024-07-12 ASSESSMENT — ANXIETY QUESTIONNAIRES
5. BEING SO RESTLESS THAT IT IS HARD TO SIT STILL: NOT AT ALL
4. TROUBLE RELAXING: NOT AT ALL
IF YOU CHECKED OFF ANY PROBLEMS ON THIS QUESTIONNAIRE, HOW DIFFICULT HAVE THESE PROBLEMS MADE IT FOR YOU TO DO YOUR WORK, TAKE CARE OF THINGS AT HOME, OR GET ALONG WITH OTHER PEOPLE: NOT DIFFICULT AT ALL
7. FEELING AFRAID AS IF SOMETHING AWFUL MIGHT HAPPEN: NOT AT ALL
2. NOT BEING ABLE TO STOP OR CONTROL WORRYING: NOT AT ALL
3. WORRYING TOO MUCH ABOUT DIFFERENT THINGS: NOT AT ALL
6. BECOMING EASILY ANNOYED OR IRRITABLE: NOT AT ALL
GAD7 TOTAL SCORE: 0
1. FEELING NERVOUS, ANXIOUS, OR ON EDGE: NOT AT ALL

## 2024-07-12 ASSESSMENT — ENCOUNTER SYMPTOMS
VOMITING: 0
WHEEZING: 0
COUGH: 0
NAUSEA: 0
CHEST TIGHTNESS: 0
SHORTNESS OF BREATH: 0
DIARRHEA: 0
ABDOMINAL PAIN: 0
BLOOD IN STOOL: 0
BACK PAIN: 0
RHINORRHEA: 0

## 2024-07-12 ASSESSMENT — PATIENT HEALTH QUESTIONNAIRE - PHQ9
1. LITTLE INTEREST OR PLEASURE IN DOING THINGS: NOT AT ALL
2. FEELING DOWN, DEPRESSED OR HOPELESS: NOT AT ALL
SUM OF ALL RESPONSES TO PHQ QUESTIONS 1-9: 0
SUM OF ALL RESPONSES TO PHQ9 QUESTIONS 1 & 2: 0
SUM OF ALL RESPONSES TO PHQ QUESTIONS 1-9: 0

## 2024-07-12 NOTE — PROGRESS NOTES
greater than 9.9 mIU/mL      Hemoglobin A1C 07/01/2024 6.4 (H)  4.8 - 5.6 % Final    Comment:             Prediabetes: 5.7 - 6.4           Diabetes: >6.4           Glycemic control for adults with diabetes: <7.0      Specimen Status Report 07/01/2024 COMMENT   Final    Comment: Kimberly Abbrev CMP14 Default  Ambjeff Abbrev CMP14 Default  A hand-written panel/profile was received from your office. In  accordance with the [facility] Ambiguous Test Code Policy dated July 2003, we have completed your order by using the closest currently  or formerly recognized AMA panel.  We have assigned Comprehensive  Metabolic Panel (14), Test Code #733835 to this request.  If this  is not the testing you wished to receive on this specimen, please  contact the [facility] Client Inquiry/Technical Services Department  to clarify the test order.  We appreciate your business.  Ambjeff Abbrev LP Default  Ambjeff Abbrev LP Default  A hand-written panel/profile was received from your office. In  accordance with the [facility] Ambiguous Test Code Policy dated July 2003, we have completed your order by using the closest currently  or formerly recognized AMA panel.  We have assigned Lipid Panel,  Test Code #[id number] to this request. If this is not the testing you  wished to receive on this specimen, plea                           se contact the [facility]  Client Inquiry/Technical Services Department to clarify the test  order.  We appreciate your business.      Creatinine, Ur 07/01/2024 29.7  Not Estab. mg/dL Final    Albumin, U 07/01/2024 <3.0  Not Estab. ug/mL Final    Microalb/Creat Ratio 07/01/2024 <10  0 - 29 mg/g creat Final    Comment:                        Normal:                0 -  29                         Moderately increased: 30 - 300                         Severely increased:       >300         No results found for any visits on 07/12/24.        Patient Care Team:  Patient Care Team:  Harriet Mccormack MD as PCP -

## 2024-09-25 DIAGNOSIS — R60.0 LOCALIZED EDEMA: ICD-10-CM

## 2024-09-25 DIAGNOSIS — E87.6 HYPOKALEMIA: ICD-10-CM

## 2024-09-25 RX ORDER — POTASSIUM CHLORIDE 1500 MG/1
TABLET, EXTENDED RELEASE ORAL
Qty: 90 TABLET | Refills: 1 | Status: SHIPPED | OUTPATIENT
Start: 2024-09-25

## 2024-09-25 RX ORDER — FUROSEMIDE 40 MG
40 TABLET ORAL DAILY
Qty: 90 TABLET | Refills: 1 | Status: SHIPPED | OUTPATIENT
Start: 2024-09-25

## 2025-01-13 SDOH — ECONOMIC STABILITY: FOOD INSECURITY: WITHIN THE PAST 12 MONTHS, THE FOOD YOU BOUGHT JUST DIDN'T LAST AND YOU DIDN'T HAVE MONEY TO GET MORE.: NEVER TRUE

## 2025-01-13 SDOH — ECONOMIC STABILITY: TRANSPORTATION INSECURITY
IN THE PAST 12 MONTHS, HAS LACK OF TRANSPORTATION KEPT YOU FROM MEETINGS, WORK, OR FROM GETTING THINGS NEEDED FOR DAILY LIVING?: NO

## 2025-01-13 SDOH — ECONOMIC STABILITY: TRANSPORTATION INSECURITY
IN THE PAST 12 MONTHS, HAS THE LACK OF TRANSPORTATION KEPT YOU FROM MEDICAL APPOINTMENTS OR FROM GETTING MEDICATIONS?: NO

## 2025-01-13 SDOH — ECONOMIC STABILITY: INCOME INSECURITY: IN THE LAST 12 MONTHS, WAS THERE A TIME WHEN YOU WERE NOT ABLE TO PAY THE MORTGAGE OR RENT ON TIME?: NO

## 2025-01-13 SDOH — ECONOMIC STABILITY: FOOD INSECURITY: WITHIN THE PAST 12 MONTHS, YOU WORRIED THAT YOUR FOOD WOULD RUN OUT BEFORE YOU GOT MONEY TO BUY MORE.: NEVER TRUE

## 2025-01-14 ENCOUNTER — OFFICE VISIT (OUTPATIENT)
Facility: CLINIC | Age: 60
End: 2025-01-14
Payer: COMMERCIAL

## 2025-01-14 VITALS
HEIGHT: 64 IN | BODY MASS INDEX: 32.58 KG/M2 | SYSTOLIC BLOOD PRESSURE: 129 MMHG | HEART RATE: 74 BPM | WEIGHT: 190.8 LBS | OXYGEN SATURATION: 97 % | DIASTOLIC BLOOD PRESSURE: 77 MMHG

## 2025-01-14 DIAGNOSIS — E78.2 MIXED HYPERLIPIDEMIA: Primary | ICD-10-CM

## 2025-01-14 DIAGNOSIS — E66.811 CLASS 1 OBESITY WITH SERIOUS COMORBIDITY AND BODY MASS INDEX (BMI) OF 30.0 TO 30.9 IN ADULT, UNSPECIFIED OBESITY TYPE: ICD-10-CM

## 2025-01-14 DIAGNOSIS — E11.65 TYPE 2 DIABETES MELLITUS WITH HYPERGLYCEMIA, WITHOUT LONG-TERM CURRENT USE OF INSULIN (HCC): ICD-10-CM

## 2025-01-14 DIAGNOSIS — F10.10 ALCOHOL ABUSE: ICD-10-CM

## 2025-01-14 DIAGNOSIS — I89.0 LYMPHEDEMA: ICD-10-CM

## 2025-01-14 DIAGNOSIS — I10 ESSENTIAL (PRIMARY) HYPERTENSION: ICD-10-CM

## 2025-01-14 DIAGNOSIS — Z12.31 BREAST CANCER SCREENING BY MAMMOGRAM: ICD-10-CM

## 2025-01-14 LAB — HBA1C MFR BLD: 6.4 %

## 2025-01-14 PROCEDURE — 99214 OFFICE O/P EST MOD 30 MIN: CPT | Performed by: STUDENT IN AN ORGANIZED HEALTH CARE EDUCATION/TRAINING PROGRAM

## 2025-01-14 PROCEDURE — 83036 HEMOGLOBIN GLYCOSYLATED A1C: CPT | Performed by: STUDENT IN AN ORGANIZED HEALTH CARE EDUCATION/TRAINING PROGRAM

## 2025-01-14 PROCEDURE — 3074F SYST BP LT 130 MM HG: CPT | Performed by: STUDENT IN AN ORGANIZED HEALTH CARE EDUCATION/TRAINING PROGRAM

## 2025-01-14 PROCEDURE — 3078F DIAST BP <80 MM HG: CPT | Performed by: STUDENT IN AN ORGANIZED HEALTH CARE EDUCATION/TRAINING PROGRAM

## 2025-01-14 RX ORDER — ATORVASTATIN CALCIUM 40 MG/1
40 TABLET, FILM COATED ORAL DAILY
Qty: 90 TABLET | Refills: 1 | Status: SHIPPED | OUTPATIENT
Start: 2025-01-14 | End: 2025-01-16

## 2025-01-14 ASSESSMENT — ENCOUNTER SYMPTOMS
WHEEZING: 0
SHORTNESS OF BREATH: 0
ABDOMINAL PAIN: 0
DIARRHEA: 0
COUGH: 0
RHINORRHEA: 0
VOMITING: 0
BLOOD IN STOOL: 0
CHEST TIGHTNESS: 0
NAUSEA: 0
BACK PAIN: 0

## 2025-01-14 ASSESSMENT — PATIENT HEALTH QUESTIONNAIRE - PHQ9
SUM OF ALL RESPONSES TO PHQ QUESTIONS 1-9: 0
1. LITTLE INTEREST OR PLEASURE IN DOING THINGS: NOT AT ALL
SUM OF ALL RESPONSES TO PHQ QUESTIONS 1-9: 0
SUM OF ALL RESPONSES TO PHQ QUESTIONS 1-9: 0
2. FEELING DOWN, DEPRESSED OR HOPELESS: NOT AT ALL
SUM OF ALL RESPONSES TO PHQ QUESTIONS 1-9: 0
SUM OF ALL RESPONSES TO PHQ9 QUESTIONS 1 & 2: 0

## 2025-01-14 NOTE — PROGRESS NOTES
Rodolfo Carreno is a 59 y.o. female presenting today for Follow-up (PT presents for follow visit.  )  .     Chief Complaint   Patient presents with    Follow-up     PT presents for follow visit.         HPI:  Rodolfo Carreno presents to the office today for follow up.    Patient has a past medical history of HLD, T2DM, HTN, GERD, chronic lymphedema.    HTN: Patient has been working on lifestyle modification.  She is currently not on any medications.   BP today is 129/77    Chronic lower extremity swelling: Patient has a history of chronic lower extremity lymphedema but has previously not been interested in being referred to the lymphedema clinic.  She is prescribed furosemide daily.  She has been wearing compression stockings with improvement    HLD: Patient was resumed back on Lipitor last visit.   Denies any myalgias.  Lipid panel in 7/2024 showed , HDL 68, triglycerides 163     T2DM: Last HbA1c was 6.4% in 7/2024.  Diet controlled.  She is currently not on any medication     GERD: Taking prilosec 20 mg OTC.      Ex-smoker: Patient stopped smoking in 2018.  Denies any shortness of breath.     Osteopenia: DEXA scan in 2019 showed low bone density.  She was advised to take calcium and vitamin D supplements.    Patient was previously noted to have hypokalemia and elevated LFTs which were improving.  She admitted to increased alcohol use.  She was counseled to cut down.  Patient binge drinks vodka on weekends.       Review of Systems   Constitutional:  Negative for activity change, appetite change, chills, diaphoresis, fatigue, fever and unexpected weight change.   HENT:  Negative for congestion, nosebleeds, postnasal drip and rhinorrhea.    Respiratory:  Negative for cough, chest tightness, shortness of breath and wheezing.    Cardiovascular:  Positive for leg swelling. Negative for chest pain.   Gastrointestinal:  Negative for abdominal pain, blood in stool, diarrhea, nausea and vomiting.        Heartburn

## 2025-01-15 DIAGNOSIS — E78.2 MIXED HYPERLIPIDEMIA: ICD-10-CM

## 2025-01-16 RX ORDER — ATORVASTATIN CALCIUM 40 MG/1
40 TABLET, FILM COATED ORAL DAILY
Qty: 90 TABLET | Refills: 1 | Status: SHIPPED | OUTPATIENT
Start: 2025-01-16

## 2025-04-12 DIAGNOSIS — R60.0 LOCALIZED EDEMA: ICD-10-CM

## 2025-04-12 DIAGNOSIS — E87.6 HYPOKALEMIA: ICD-10-CM

## 2025-04-16 RX ORDER — POTASSIUM CHLORIDE 1500 MG/1
20 TABLET, EXTENDED RELEASE ORAL DAILY
Qty: 90 TABLET | Refills: 1 | Status: SHIPPED | OUTPATIENT
Start: 2025-04-16

## 2025-04-16 RX ORDER — FUROSEMIDE 40 MG/1
40 TABLET ORAL DAILY
Qty: 90 TABLET | Refills: 1 | Status: SHIPPED | OUTPATIENT
Start: 2025-04-16

## 2025-06-24 ENCOUNTER — HOSPITAL ENCOUNTER (OUTPATIENT)
Facility: HOSPITAL | Age: 60
Discharge: HOME | End: 2025-06-27
Attending: STUDENT IN AN ORGANIZED HEALTH CARE EDUCATION/TRAINING PROGRAM
Payer: COMMERCIAL

## 2025-06-24 ENCOUNTER — HOSPITAL ENCOUNTER (OUTPATIENT)
Age: 60
Setting detail: SPECIMEN
Discharge: HOME | End: 2025-06-27

## 2025-06-24 VITALS — HEIGHT: 64 IN | BODY MASS INDEX: 32.1 KG/M2 | WEIGHT: 188 LBS

## 2025-06-24 DIAGNOSIS — I10 ESSENTIAL (PRIMARY) HYPERTENSION: ICD-10-CM

## 2025-06-24 DIAGNOSIS — E87.6 HYPOKALEMIA: ICD-10-CM

## 2025-06-24 DIAGNOSIS — E78.2 MIXED HYPERLIPIDEMIA: ICD-10-CM

## 2025-06-24 DIAGNOSIS — E11.65 TYPE 2 DIABETES MELLITUS WITH HYPERGLYCEMIA, WITHOUT LONG-TERM CURRENT USE OF INSULIN (HCC): ICD-10-CM

## 2025-06-24 DIAGNOSIS — Z12.31 BREAST CANCER SCREENING BY MAMMOGRAM: ICD-10-CM

## 2025-06-24 LAB — LABCORP SPECIMEN COLLECTION: NORMAL

## 2025-06-24 PROCEDURE — 77063 BREAST TOMOSYNTHESIS BI: CPT

## 2025-06-24 PROCEDURE — 99001 SPECIMEN HANDLING PT-LAB: CPT

## 2025-06-25 LAB
ALBUMIN SERPL-MCNC: 3.4 G/DL (ref 3.8–4.9)
ALBUMIN/CREAT UR: <4 MG/G CREAT (ref 0–29)
ALP SERPL-CCNC: 82 IU/L (ref 44–121)
ALT SERPL-CCNC: 37 IU/L (ref 0–32)
AST SERPL-CCNC: 43 IU/L (ref 0–40)
BILIRUB DIRECT SERPL-MCNC: 0.09 MG/DL (ref 0–0.4)
BILIRUB SERPL-MCNC: <0.2 MG/DL (ref 0–1.2)
BUN SERPL-MCNC: 19 MG/DL (ref 6–24)
BUN/CREAT SERPL: 23 (ref 9–23)
CALCIUM SERPL-MCNC: 8.7 MG/DL (ref 8.7–10.2)
CHLORIDE SERPL-SCNC: 98 MMOL/L (ref 96–106)
CHOLEST SERPL-MCNC: 173 MG/DL (ref 100–199)
CO2 SERPL-SCNC: 23 MMOL/L (ref 20–29)
CREAT SERPL-MCNC: 0.82 MG/DL (ref 0.57–1)
CREAT UR-MCNC: 79.8 MG/DL
EGFRCR SERPLBLD CKD-EPI 2021: 83 ML/MIN/1.73
GLUCOSE SERPL-MCNC: 107 MG/DL (ref 70–99)
HDLC SERPL-MCNC: 54 MG/DL
LDLC SERPL CALC-MCNC: 88 MG/DL (ref 0–99)
MICROALBUMIN UR-MCNC: <3 UG/ML
POTASSIUM SERPL-SCNC: 3.5 MMOL/L (ref 3.5–5.2)
PROT SERPL-MCNC: 5.2 G/DL (ref 6–8.5)
SODIUM SERPL-SCNC: 138 MMOL/L (ref 134–144)
TRIGL SERPL-MCNC: 180 MG/DL (ref 0–149)
VLDLC SERPL CALC-MCNC: 31 MG/DL (ref 5–40)

## 2025-06-27 LAB
EST. AVERAGE GLUCOSE BLD GHB EST-MCNC: 134 MG/DL
HBA1C MFR BLD: 6.3 %HB